# Patient Record
Sex: FEMALE | Race: WHITE | NOT HISPANIC OR LATINO | URBAN - METROPOLITAN AREA
[De-identification: names, ages, dates, MRNs, and addresses within clinical notes are randomized per-mention and may not be internally consistent; named-entity substitution may affect disease eponyms.]

---

## 2022-09-26 ENCOUNTER — INPATIENT (INPATIENT)
Facility: HOSPITAL | Age: 87
LOS: 3 days | Discharge: HOME HEALTH | DRG: 480 | End: 2022-09-30
Attending: HOSPITALIST | Admitting: INTERNAL MEDICINE
Payer: MEDICARE

## 2022-09-26 VITALS
HEIGHT: 63 IN | WEIGHT: 179.9 LBS | RESPIRATION RATE: 20 BRPM | HEART RATE: 88 BPM | SYSTOLIC BLOOD PRESSURE: 153 MMHG | DIASTOLIC BLOOD PRESSURE: 81 MMHG | TEMPERATURE: 98 F

## 2022-09-26 LAB
BASOPHILS # BLD AUTO: 0.1 K/UL — SIGNIFICANT CHANGE UP (ref 0–0.2)
BASOPHILS NFR BLD AUTO: 0.5 % — SIGNIFICANT CHANGE UP (ref 0–2)
EOSINOPHIL # BLD AUTO: 0.07 K/UL — SIGNIFICANT CHANGE UP (ref 0–0.5)
EOSINOPHIL NFR BLD AUTO: 0.3 % — SIGNIFICANT CHANGE UP (ref 0–6)
HCT VFR BLD CALC: 43.4 % — SIGNIFICANT CHANGE UP (ref 34.5–45)
HGB BLD-MCNC: 14.4 G/DL — SIGNIFICANT CHANGE UP (ref 11.5–15.5)
IMM GRANULOCYTES NFR BLD AUTO: 1 % — HIGH (ref 0–0.9)
LYMPHOCYTES # BLD AUTO: 11.4 % — LOW (ref 13–44)
LYMPHOCYTES # BLD AUTO: 2.31 K/UL — SIGNIFICANT CHANGE UP (ref 1–3.3)
MCHC RBC-ENTMCNC: 32.4 PG — SIGNIFICANT CHANGE UP (ref 27–34)
MCHC RBC-ENTMCNC: 33.2 GM/DL — SIGNIFICANT CHANGE UP (ref 32–36)
MCV RBC AUTO: 97.5 FL — SIGNIFICANT CHANGE UP (ref 80–100)
MONOCYTES # BLD AUTO: 0.77 K/UL — SIGNIFICANT CHANGE UP (ref 0–0.9)
MONOCYTES NFR BLD AUTO: 3.8 % — SIGNIFICANT CHANGE UP (ref 2–14)
NEUTROPHILS # BLD AUTO: 16.85 K/UL — HIGH (ref 1.8–7.4)
NEUTROPHILS NFR BLD AUTO: 83 % — HIGH (ref 43–77)
PLATELET # BLD AUTO: 351 K/UL — SIGNIFICANT CHANGE UP (ref 150–400)
RBC # BLD: 4.45 M/UL — SIGNIFICANT CHANGE UP (ref 3.8–5.2)
RBC # FLD: 14.6 % — HIGH (ref 10.3–14.5)
WBC # BLD: 20.31 K/UL — HIGH (ref 3.8–10.5)
WBC # FLD AUTO: 20.31 K/UL — HIGH (ref 3.8–10.5)

## 2022-09-26 PROCEDURE — 99285 EMERGENCY DEPT VISIT HI MDM: CPT

## 2022-09-26 RX ORDER — MORPHINE SULFATE 50 MG/1
2 CAPSULE, EXTENDED RELEASE ORAL
Refills: 0 | Status: DISCONTINUED | OUTPATIENT
Start: 2022-09-26 | End: 2022-09-30

## 2022-09-26 NOTE — ED ADULT TRIAGE NOTE - CHIEF COMPLAINT QUOTE
Patient BIBEMS s/p fall in the kitchen. Patient unsure what happened, all she know is she tripped. Denies head strike, -LOC. Not on blood thinners. Endorses pain on her right hip thigh area. In triage patient is 90% RA, placed on 2L. Denies chest pain.

## 2022-09-26 NOTE — ED PROVIDER NOTE - PROGRESS NOTE DETAILS
case d/w ortho and request cross table lateral and femur and will evaluate pt case d/w Pravin Kessler and will admit with bridge and MADISON

## 2022-09-26 NOTE — ED PROVIDER NOTE - OBJECTIVE STATEMENT
87 y/o female in ED c/o right hip pain s/p trip and fall onto right hip x 1 hr.   pt's son states pt was standing in front of  with her rubber bottom sneakers and appeared that pt attempted to turn when her foot got caught leading to fall onto right side.   denies any LOC, HA, head trauma, neck pain, cp, sob, n/v/d/abd pain.   states pt stuck her hands out.

## 2022-09-26 NOTE — ED ADULT TRIAGE NOTE - BSA (M2)
1.85
Return to doctor sooner if fever > 101 x 2 days, difficulty breathing or swallowing, vomiting green or with blood, diarrhea with blood , refuses to drink fluids, less than 3 urinations per day or symptoms worse.    [place] BRAT , bananas, RICE, apple sauce , toast cracker, Ronald soup    Limit dairy or greasy foods, no juice or fruits or vegetables till diarrhea better    Tylenol as needed for pain    Diarrhea, Child  Diarrhea is frequent loose and watery bowel movements. Diarrhea can make your child feel weak and cause him or her to become dehydrated. Dehydration can make your child tired and thirsty. Your child may also urinate less often and have a dry mouth. Diarrhea typically lasts 2–3 days. However, it can last longer if it is a sign of something more serious. It is important to treat diarrhea as told by your child’s health care provider.    Follow these instructions at home:  Eating and drinking     Follow these recommendations as told by your child’s health care provider:    Give your child an oral rehydration solution (ORS), if directed. This is a drink that is sold at pharmacies and retail stores.  Encourage your child to drink lots of fluids to prevent dehydration. Avoid giving your child fluids that contain a lot of sugar or caffeine, such as juice and soda.  Continue to breastfeed or bottle-feed your young child. Do not give extra water to your child.  Continue your child’s regular diet, but avoid spicy or fatty foods, such as french fries or pizza.    General instructions     Make sure that you and your child wash your hands often. If soap and water are not available, use hand .  Make sure that all people in your household wash their hands well and often.  Give over-the-counter and prescription medicines only as told by your child's health care provider.  Have your child take a warm bath to relieve any burning or pain from frequent diarrhea episodes.  Watch your child’s condition for any changes.  Have your child drink enough fluids to keep his or her urine clear or pale yellow.  Keep all follow-up visits as told by your child's health care provider. This is important.    Contact a health care provider if:  Your child’s diarrhea lasts longer than 3 days.  Your child has a fever.  Your child will not drink fluids or cannot keep fluids down.  Your child feels light-headed or dizzy.  Your child has a headache.  Your child has muscle cramps.  Get help right away if:  You notice signs of dehydration in your child, such as:    No urine in 8–12 hours.  Cracked lips.  Not making tears while crying.  Dry mouth.  Sunken eyes.  Sleepiness.  Weakness.    Your child starts to vomit.  Your child has bloody or black stools or stools that look like tar.  Your child has pain in the abdomen.  Your child has difficulty breathing or is breathing very quickly.  Your child’s heart is beating very quickly.  Your child's skin feels cold and clammy.  Your child seems confused.  This information is not intended to replace advice given to you by your health care provider. Make sure you discuss any questions you have with your health care provider.

## 2022-09-27 ENCOUNTER — TRANSCRIPTION ENCOUNTER (OUTPATIENT)
Age: 87
End: 2022-09-27

## 2022-09-27 DIAGNOSIS — S72.001A FRACTURE OF UNSPECIFIED PART OF NECK OF RIGHT FEMUR, INITIAL ENCOUNTER FOR CLOSED FRACTURE: ICD-10-CM

## 2022-09-27 LAB
24R-OH-CALCIDIOL SERPL-MCNC: 19.5 NG/ML — LOW (ref 30–80)
ALBUMIN SERPL ELPH-MCNC: 3.4 G/DL — SIGNIFICANT CHANGE UP (ref 3.3–5)
ALBUMIN SERPL ELPH-MCNC: 3.8 G/DL — SIGNIFICANT CHANGE UP (ref 3.3–5)
ALP SERPL-CCNC: 83 U/L — SIGNIFICANT CHANGE UP (ref 40–120)
ALT FLD-CCNC: 22 U/L — SIGNIFICANT CHANGE UP (ref 12–78)
ANION GAP SERPL CALC-SCNC: 6 MMOL/L — SIGNIFICANT CHANGE UP (ref 5–17)
ANION GAP SERPL CALC-SCNC: 7 MMOL/L — SIGNIFICANT CHANGE UP (ref 5–17)
ANION GAP SERPL CALC-SCNC: 9 MMOL/L — SIGNIFICANT CHANGE UP (ref 5–17)
APTT BLD: 26 SEC — LOW (ref 27.5–35.5)
APTT BLD: 27.7 SEC — SIGNIFICANT CHANGE UP (ref 27.5–35.5)
AST SERPL-CCNC: 28 U/L — SIGNIFICANT CHANGE UP (ref 15–37)
BILIRUB SERPL-MCNC: 0.4 MG/DL — SIGNIFICANT CHANGE UP (ref 0.2–1.2)
BUN SERPL-MCNC: 18 MG/DL — SIGNIFICANT CHANGE UP (ref 7–23)
BUN SERPL-MCNC: 20 MG/DL — SIGNIFICANT CHANGE UP (ref 7–23)
BUN SERPL-MCNC: 22 MG/DL — SIGNIFICANT CHANGE UP (ref 7–23)
CALCIUM SERPL-MCNC: 8.9 MG/DL — SIGNIFICANT CHANGE UP (ref 8.5–10.1)
CALCIUM SERPL-MCNC: 9.2 MG/DL — SIGNIFICANT CHANGE UP (ref 8.5–10.1)
CALCIUM SERPL-MCNC: 9.6 MG/DL — SIGNIFICANT CHANGE UP (ref 8.5–10.1)
CHLORIDE SERPL-SCNC: 107 MMOL/L — SIGNIFICANT CHANGE UP (ref 96–108)
CHLORIDE SERPL-SCNC: 108 MMOL/L — SIGNIFICANT CHANGE UP (ref 96–108)
CHLORIDE SERPL-SCNC: 111 MMOL/L — HIGH (ref 96–108)
CO2 SERPL-SCNC: 24 MMOL/L — SIGNIFICANT CHANGE UP (ref 22–31)
CO2 SERPL-SCNC: 24 MMOL/L — SIGNIFICANT CHANGE UP (ref 22–31)
CO2 SERPL-SCNC: 25 MMOL/L — SIGNIFICANT CHANGE UP (ref 22–31)
CREAT SERPL-MCNC: 0.98 MG/DL — SIGNIFICANT CHANGE UP (ref 0.5–1.3)
CREAT SERPL-MCNC: 1.06 MG/DL — SIGNIFICANT CHANGE UP (ref 0.5–1.3)
CREAT SERPL-MCNC: 1.32 MG/DL — HIGH (ref 0.5–1.3)
EGFR: 39 ML/MIN/1.73M2 — LOW
EGFR: 51 ML/MIN/1.73M2 — LOW
EGFR: 56 ML/MIN/1.73M2 — LOW
GLUCOSE SERPL-MCNC: 152 MG/DL — HIGH (ref 70–99)
GLUCOSE SERPL-MCNC: 170 MG/DL — HIGH (ref 70–99)
GLUCOSE SERPL-MCNC: 203 MG/DL — HIGH (ref 70–99)
HCT VFR BLD CALC: 36.1 % — SIGNIFICANT CHANGE UP (ref 34.5–45)
HCT VFR BLD CALC: 40 % — SIGNIFICANT CHANGE UP (ref 34.5–45)
HGB BLD-MCNC: 11.9 G/DL — SIGNIFICANT CHANGE UP (ref 11.5–15.5)
HGB BLD-MCNC: 13.3 G/DL — SIGNIFICANT CHANGE UP (ref 11.5–15.5)
INR BLD: 0.97 RATIO — SIGNIFICANT CHANGE UP (ref 0.88–1.16)
INR BLD: 1.05 RATIO — SIGNIFICANT CHANGE UP (ref 0.88–1.16)
MCHC RBC-ENTMCNC: 32 PG — SIGNIFICANT CHANGE UP (ref 27–34)
MCHC RBC-ENTMCNC: 32.6 PG — SIGNIFICANT CHANGE UP (ref 27–34)
MCHC RBC-ENTMCNC: 33 GM/DL — SIGNIFICANT CHANGE UP (ref 32–36)
MCHC RBC-ENTMCNC: 33.3 GM/DL — SIGNIFICANT CHANGE UP (ref 32–36)
MCV RBC AUTO: 96.2 FL — SIGNIFICANT CHANGE UP (ref 80–100)
MCV RBC AUTO: 98.9 FL — SIGNIFICANT CHANGE UP (ref 80–100)
PLATELET # BLD AUTO: 264 K/UL — SIGNIFICANT CHANGE UP (ref 150–400)
PLATELET # BLD AUTO: 294 K/UL — SIGNIFICANT CHANGE UP (ref 150–400)
POTASSIUM SERPL-MCNC: 3.9 MMOL/L — SIGNIFICANT CHANGE UP (ref 3.5–5.3)
POTASSIUM SERPL-MCNC: 4 MMOL/L — SIGNIFICANT CHANGE UP (ref 3.5–5.3)
POTASSIUM SERPL-MCNC: 4.1 MMOL/L — SIGNIFICANT CHANGE UP (ref 3.5–5.3)
POTASSIUM SERPL-SCNC: 3.9 MMOL/L — SIGNIFICANT CHANGE UP (ref 3.5–5.3)
POTASSIUM SERPL-SCNC: 4 MMOL/L — SIGNIFICANT CHANGE UP (ref 3.5–5.3)
POTASSIUM SERPL-SCNC: 4.1 MMOL/L — SIGNIFICANT CHANGE UP (ref 3.5–5.3)
PROT SERPL-MCNC: 8.2 GM/DL — SIGNIFICANT CHANGE UP (ref 6–8.3)
PROTHROM AB SERPL-ACNC: 11.3 SEC — SIGNIFICANT CHANGE UP (ref 10.5–13.4)
PROTHROM AB SERPL-ACNC: 12.2 SEC — SIGNIFICANT CHANGE UP (ref 10.5–13.4)
RAPID RVP RESULT: SIGNIFICANT CHANGE UP
RBC # BLD: 3.65 M/UL — LOW (ref 3.8–5.2)
RBC # BLD: 4.16 M/UL — SIGNIFICANT CHANGE UP (ref 3.8–5.2)
RBC # FLD: 14.6 % — HIGH (ref 10.3–14.5)
RBC # FLD: 14.9 % — HIGH (ref 10.3–14.5)
SARS-COV-2 RNA SPEC QL NAA+PROBE: SIGNIFICANT CHANGE UP
SODIUM SERPL-SCNC: 139 MMOL/L — SIGNIFICANT CHANGE UP (ref 135–145)
SODIUM SERPL-SCNC: 140 MMOL/L — SIGNIFICANT CHANGE UP (ref 135–145)
SODIUM SERPL-SCNC: 142 MMOL/L — SIGNIFICANT CHANGE UP (ref 135–145)
TROPONIN I, HIGH SENSITIVITY RESULT: 5.7 NG/L — SIGNIFICANT CHANGE UP
WBC # BLD: 18.43 K/UL — HIGH (ref 3.8–10.5)
WBC # BLD: 19.82 K/UL — HIGH (ref 3.8–10.5)
WBC # FLD AUTO: 18.43 K/UL — HIGH (ref 3.8–10.5)
WBC # FLD AUTO: 19.82 K/UL — HIGH (ref 3.8–10.5)

## 2022-09-27 PROCEDURE — 76000 FLUOROSCOPY <1 HR PHYS/QHP: CPT

## 2022-09-27 PROCEDURE — 87635 SARS-COV-2 COVID-19 AMP PRB: CPT

## 2022-09-27 PROCEDURE — 97530 THERAPEUTIC ACTIVITIES: CPT | Mod: GP

## 2022-09-27 PROCEDURE — 93005 ELECTROCARDIOGRAM TRACING: CPT

## 2022-09-27 PROCEDURE — 93010 ELECTROCARDIOGRAM REPORT: CPT

## 2022-09-27 PROCEDURE — 73560 X-RAY EXAM OF KNEE 1 OR 2: CPT | Mod: 26,RT

## 2022-09-27 PROCEDURE — 71045 X-RAY EXAM CHEST 1 VIEW: CPT | Mod: 26

## 2022-09-27 PROCEDURE — C1889: CPT

## 2022-09-27 PROCEDURE — 85610 PROTHROMBIN TIME: CPT

## 2022-09-27 PROCEDURE — 36415 COLL VENOUS BLD VENIPUNCTURE: CPT

## 2022-09-27 PROCEDURE — C1713: CPT

## 2022-09-27 PROCEDURE — 73552 X-RAY EXAM OF FEMUR 2/>: CPT | Mod: 26,RT

## 2022-09-27 PROCEDURE — 82306 VITAMIN D 25 HYDROXY: CPT

## 2022-09-27 PROCEDURE — 73552 X-RAY EXAM OF FEMUR 2/>: CPT | Mod: RT

## 2022-09-27 PROCEDURE — 73502 X-RAY EXAM HIP UNI 2-3 VIEWS: CPT | Mod: 26,RT

## 2022-09-27 PROCEDURE — 80048 BASIC METABOLIC PNL TOTAL CA: CPT

## 2022-09-27 PROCEDURE — 99223 1ST HOSP IP/OBS HIGH 75: CPT

## 2022-09-27 PROCEDURE — 71045 X-RAY EXAM CHEST 1 VIEW: CPT

## 2022-09-27 PROCEDURE — 87040 BLOOD CULTURE FOR BACTERIA: CPT

## 2022-09-27 PROCEDURE — 73560 X-RAY EXAM OF KNEE 1 OR 2: CPT | Mod: RT

## 2022-09-27 PROCEDURE — 82040 ASSAY OF SERUM ALBUMIN: CPT

## 2022-09-27 PROCEDURE — 85730 THROMBOPLASTIN TIME PARTIAL: CPT

## 2022-09-27 PROCEDURE — 97162 PT EVAL MOD COMPLEX 30 MIN: CPT | Mod: GP

## 2022-09-27 PROCEDURE — 93970 EXTREMITY STUDY: CPT

## 2022-09-27 PROCEDURE — 27245 TREAT THIGH FRACTURE: CPT | Mod: RT

## 2022-09-27 PROCEDURE — 85027 COMPLETE CBC AUTOMATED: CPT

## 2022-09-27 PROCEDURE — 71275 CT ANGIOGRAPHY CHEST: CPT

## 2022-09-27 PROCEDURE — 97116 GAIT TRAINING THERAPY: CPT | Mod: GP

## 2022-09-27 PROCEDURE — C9399: CPT

## 2022-09-27 PROCEDURE — 27245 TREAT THIGH FRACTURE: CPT | Mod: AS

## 2022-09-27 RX ORDER — OXYCODONE HYDROCHLORIDE 5 MG/1
2.5 TABLET ORAL EVERY 4 HOURS
Refills: 0 | Status: DISCONTINUED | OUTPATIENT
Start: 2022-09-27 | End: 2022-09-27

## 2022-09-27 RX ORDER — SIMVASTATIN 20 MG/1
1 TABLET, FILM COATED ORAL
Qty: 0 | Refills: 0 | DISCHARGE

## 2022-09-27 RX ORDER — POLYETHYLENE GLYCOL 3350 17 G/17G
17 POWDER, FOR SOLUTION ORAL AT BEDTIME
Refills: 0 | Status: DISCONTINUED | OUTPATIENT
Start: 2022-09-27 | End: 2022-09-27

## 2022-09-27 RX ORDER — CEFAZOLIN SODIUM 1 G
2000 VIAL (EA) INJECTION EVERY 8 HOURS
Refills: 0 | Status: COMPLETED | OUTPATIENT
Start: 2022-09-27 | End: 2022-09-28

## 2022-09-27 RX ORDER — SODIUM CHLORIDE 9 MG/ML
1000 INJECTION, SOLUTION INTRAVENOUS
Refills: 0 | Status: DISCONTINUED | OUTPATIENT
Start: 2022-09-27 | End: 2022-09-30

## 2022-09-27 RX ORDER — OXYCODONE HYDROCHLORIDE 5 MG/1
10 TABLET ORAL EVERY 4 HOURS
Refills: 0 | Status: DISCONTINUED | OUTPATIENT
Start: 2022-09-27 | End: 2022-09-27

## 2022-09-27 RX ORDER — AMLODIPINE BESYLATE 2.5 MG/1
1 TABLET ORAL
Qty: 0 | Refills: 0 | DISCHARGE

## 2022-09-27 RX ORDER — OXYCODONE HYDROCHLORIDE 5 MG/1
5 TABLET ORAL ONCE
Refills: 0 | Status: DISCONTINUED | OUTPATIENT
Start: 2022-09-27 | End: 2022-09-27

## 2022-09-27 RX ORDER — FERROUS SULFATE 325(65) MG
325 TABLET ORAL DAILY
Refills: 0 | Status: DISCONTINUED | OUTPATIENT
Start: 2022-09-27 | End: 2022-09-30

## 2022-09-27 RX ORDER — OXYCODONE HYDROCHLORIDE 5 MG/1
10 TABLET ORAL EVERY 4 HOURS
Refills: 0 | Status: DISCONTINUED | OUTPATIENT
Start: 2022-09-27 | End: 2022-09-30

## 2022-09-27 RX ORDER — HEPARIN SODIUM 5000 [USP'U]/ML
5000 INJECTION INTRAVENOUS; SUBCUTANEOUS EVERY 8 HOURS
Refills: 0 | Status: DISCONTINUED | OUTPATIENT
Start: 2022-09-28 | End: 2022-09-28

## 2022-09-27 RX ORDER — SODIUM CHLORIDE 9 MG/ML
1000 INJECTION, SOLUTION INTRAVENOUS
Refills: 0 | Status: DISCONTINUED | OUTPATIENT
Start: 2022-09-27 | End: 2022-09-27

## 2022-09-27 RX ORDER — SENNA PLUS 8.6 MG/1
2 TABLET ORAL AT BEDTIME
Refills: 0 | Status: DISCONTINUED | OUTPATIENT
Start: 2022-09-27 | End: 2022-09-27

## 2022-09-27 RX ORDER — FENTANYL CITRATE 50 UG/ML
50 INJECTION INTRAVENOUS
Refills: 0 | Status: DISCONTINUED | OUTPATIENT
Start: 2022-09-27 | End: 2022-09-27

## 2022-09-27 RX ORDER — LEVOTHYROXINE SODIUM 125 MCG
75 TABLET ORAL DAILY
Refills: 0 | Status: DISCONTINUED | OUTPATIENT
Start: 2022-09-27 | End: 2022-09-30

## 2022-09-27 RX ORDER — SIMVASTATIN 20 MG/1
40 TABLET, FILM COATED ORAL AT BEDTIME
Refills: 0 | Status: DISCONTINUED | OUTPATIENT
Start: 2022-09-27 | End: 2022-09-30

## 2022-09-27 RX ORDER — AMLODIPINE BESYLATE 2.5 MG/1
10 TABLET ORAL DAILY
Refills: 0 | Status: DISCONTINUED | OUTPATIENT
Start: 2022-09-27 | End: 2022-09-30

## 2022-09-27 RX ORDER — SODIUM CHLORIDE 9 MG/ML
1000 INJECTION INTRAMUSCULAR; INTRAVENOUS; SUBCUTANEOUS
Refills: 0 | Status: DISCONTINUED | OUTPATIENT
Start: 2022-09-27 | End: 2022-09-30

## 2022-09-27 RX ORDER — OXYCODONE HYDROCHLORIDE 5 MG/1
5 TABLET ORAL EVERY 4 HOURS
Refills: 0 | Status: DISCONTINUED | OUTPATIENT
Start: 2022-09-27 | End: 2022-09-27

## 2022-09-27 RX ORDER — OXYCODONE HYDROCHLORIDE 5 MG/1
5 TABLET ORAL EVERY 4 HOURS
Refills: 0 | Status: DISCONTINUED | OUTPATIENT
Start: 2022-09-27 | End: 2022-09-30

## 2022-09-27 RX ORDER — FOLIC ACID 0.8 MG
1 TABLET ORAL DAILY
Refills: 0 | Status: DISCONTINUED | OUTPATIENT
Start: 2022-09-27 | End: 2022-09-30

## 2022-09-27 RX ORDER — ONDANSETRON 8 MG/1
4 TABLET, FILM COATED ORAL ONCE
Refills: 0 | Status: DISCONTINUED | OUTPATIENT
Start: 2022-09-27 | End: 2022-09-27

## 2022-09-27 RX ORDER — CELECOXIB 200 MG/1
200 CAPSULE ORAL EVERY 12 HOURS
Refills: 0 | Status: DISCONTINUED | OUTPATIENT
Start: 2022-09-28 | End: 2022-09-30

## 2022-09-27 RX ORDER — ONDANSETRON 8 MG/1
8 TABLET, FILM COATED ORAL EVERY 8 HOURS
Refills: 0 | Status: DISCONTINUED | OUTPATIENT
Start: 2022-09-27 | End: 2022-09-30

## 2022-09-27 RX ORDER — POLYETHYLENE GLYCOL 3350 17 G/17G
17 POWDER, FOR SOLUTION ORAL DAILY
Refills: 0 | Status: DISCONTINUED | OUTPATIENT
Start: 2022-09-27 | End: 2022-09-30

## 2022-09-27 RX ORDER — PANTOPRAZOLE SODIUM 20 MG/1
40 TABLET, DELAYED RELEASE ORAL
Refills: 0 | Status: DISCONTINUED | OUTPATIENT
Start: 2022-09-27 | End: 2022-09-30

## 2022-09-27 RX ORDER — ACETAMINOPHEN 500 MG
975 TABLET ORAL EVERY 8 HOURS
Refills: 0 | Status: DISCONTINUED | OUTPATIENT
Start: 2022-09-27 | End: 2022-09-30

## 2022-09-27 RX ORDER — LISINOPRIL 2.5 MG/1
20 TABLET ORAL DAILY
Refills: 0 | Status: DISCONTINUED | OUTPATIENT
Start: 2022-09-27 | End: 2022-09-30

## 2022-09-27 RX ORDER — HYDROMORPHONE HYDROCHLORIDE 2 MG/ML
0.5 INJECTION INTRAMUSCULAR; INTRAVENOUS; SUBCUTANEOUS EVERY 4 HOURS
Refills: 0 | Status: DISCONTINUED | OUTPATIENT
Start: 2022-09-27 | End: 2022-09-30

## 2022-09-27 RX ORDER — SENNA PLUS 8.6 MG/1
2 TABLET ORAL AT BEDTIME
Refills: 0 | Status: DISCONTINUED | OUTPATIENT
Start: 2022-09-27 | End: 2022-09-30

## 2022-09-27 RX ORDER — ACETAMINOPHEN 500 MG
975 TABLET ORAL EVERY 8 HOURS
Refills: 0 | Status: DISCONTINUED | OUTPATIENT
Start: 2022-09-27 | End: 2022-09-27

## 2022-09-27 RX ORDER — ASCORBIC ACID 60 MG
500 TABLET,CHEWABLE ORAL
Refills: 0 | Status: DISCONTINUED | OUTPATIENT
Start: 2022-09-27 | End: 2022-09-30

## 2022-09-27 RX ORDER — ACETAMINOPHEN 500 MG
1000 TABLET ORAL ONCE
Refills: 0 | Status: COMPLETED | OUTPATIENT
Start: 2022-09-27 | End: 2022-09-27

## 2022-09-27 RX ORDER — LEVOTHYROXINE SODIUM 125 MCG
1 TABLET ORAL
Qty: 0 | Refills: 0 | DISCHARGE

## 2022-09-27 RX ORDER — TRAMADOL HYDROCHLORIDE 50 MG/1
50 TABLET ORAL EVERY 4 HOURS
Refills: 0 | Status: DISCONTINUED | OUTPATIENT
Start: 2022-09-27 | End: 2022-09-30

## 2022-09-27 RX ORDER — PROCHLORPERAZINE MALEATE 5 MG
5 TABLET ORAL EVERY 8 HOURS
Refills: 0 | Status: DISCONTINUED | OUTPATIENT
Start: 2022-09-27 | End: 2022-09-30

## 2022-09-27 RX ADMIN — SENNA PLUS 2 TABLET(S): 8.6 TABLET ORAL at 22:23

## 2022-09-27 RX ADMIN — Medication 975 MILLIGRAM(S): at 14:56

## 2022-09-27 RX ADMIN — Medication 975 MILLIGRAM(S): at 13:56

## 2022-09-27 RX ADMIN — Medication 400 MILLIGRAM(S): at 20:11

## 2022-09-27 RX ADMIN — FENTANYL CITRATE 50 MICROGRAM(S): 50 INJECTION INTRAVENOUS at 19:30

## 2022-09-27 RX ADMIN — MORPHINE SULFATE 2 MILLIGRAM(S): 50 CAPSULE, EXTENDED RELEASE ORAL at 02:30

## 2022-09-27 RX ADMIN — Medication 500 MILLIGRAM(S): at 22:23

## 2022-09-27 RX ADMIN — Medication 75 MICROGRAM(S): at 10:41

## 2022-09-27 RX ADMIN — LISINOPRIL 20 MILLIGRAM(S): 2.5 TABLET ORAL at 10:41

## 2022-09-27 RX ADMIN — SIMVASTATIN 40 MILLIGRAM(S): 20 TABLET, FILM COATED ORAL at 22:23

## 2022-09-27 RX ADMIN — OXYCODONE HYDROCHLORIDE 10 MILLIGRAM(S): 5 TABLET ORAL at 11:41

## 2022-09-27 RX ADMIN — AMLODIPINE BESYLATE 10 MILLIGRAM(S): 2.5 TABLET ORAL at 10:41

## 2022-09-27 RX ADMIN — MORPHINE SULFATE 2 MILLIGRAM(S): 50 CAPSULE, EXTENDED RELEASE ORAL at 05:04

## 2022-09-27 RX ADMIN — OXYCODONE HYDROCHLORIDE 10 MILLIGRAM(S): 5 TABLET ORAL at 10:41

## 2022-09-27 RX ADMIN — SODIUM CHLORIDE 75 MILLILITER(S): 9 INJECTION, SOLUTION INTRAVENOUS at 06:00

## 2022-09-27 NOTE — CONSULT NOTE ADULT - SUBJECTIVE AND OBJECTIVE BOX
88yFemale c/o R hip pain s/p mechanical fall. Patient denies head hit or LOC. Patient denies numbness or tingling in the RLE. Patient denies any other injuries.    PMH:    PSH:    AH:    Meds: See med rec    T(C): 36.7 (09-26-22 @ 22:48)  HR: 88 (09-26-22 @ 22:48)  BP: 153/81 (09-26-22 @ 22:48)  RR: 20 (09-26-22 @ 22:48)  SpO2: --  Wt(kg): --                        14.4   20.31 )-----------( 351      ( 26 Sep 2022 23:45 )             43.4     09-26    140  |  107  |  22  ----------------------------<  203<H>  4.1   |  24  |  1.32<H>    Ca    9.6      26 Sep 2022 23:45    TPro  8.2  /  Alb  3.8  /  TBili  0.4  /  DBili  x   /  AST  28  /  ALT  22  /  AlkPhos  83  09-26    PT/INR - ( 26 Sep 2022 23:45 )   PT: 11.3 sec;   INR: 0.97 ratio         PTT - ( 26 Sep 2022 23:45 )  PTT:27.7 sec      PE  Gen: NAD, alert and oriented  Resp: Unlabored breathing  RLE: Skin intact, no ecchymosis,        SILT DP/SP/ Kenn/Saph,        +EHL/FHL/TA/Gastroc,        Knee/ankle painless ROM,        hip ROM limited 2/2 pain,       DP+,        soft compartments, no calf ttp,        +log roll.      Secondary:  No TTP over bony landmarks, SILT BL, ROM intact BL, distal pulses palpable.    Imaging:  XR demonstrating R hip IT fracture    Assessment: 88F with R IT fx    Plan:  -Plan for surgical intervention for IM Nail , will book and begin preop.  -Preop labs/imaging: CBC/BMP/PT/PTT/INR/T&S/Covid/CXR/EKG.  -NPO after midnight/IVFs while NPO.  -NWB  -DVT ppx - HOLD CHEMICAL DVT prophylaxis for OR today  -Pain control prn  -Medical management, continue home meds. Please document medical clearance for surgery.  -Case discussed with attending, will advise if plan changes.  88yFemale c/o R hip pain s/p mechanical fall at home in the kitchen. Patient uses a walker at baseline. Patient denies head hit or LOC. Patient denies numbness or tingling in the RLE. Patient denies any other injuries.    PMH:    PSH:    AH:    Meds: See med rec    T(C): 36.7 (09-26-22 @ 22:48)  HR: 88 (09-26-22 @ 22:48)  BP: 153/81 (09-26-22 @ 22:48)  RR: 20 (09-26-22 @ 22:48)  SpO2: --  Wt(kg): --                        14.4   20.31 )-----------( 351      ( 26 Sep 2022 23:45 )             43.4     09-26    140  |  107  |  22  ----------------------------<  203<H>  4.1   |  24  |  1.32<H>    Ca    9.6      26 Sep 2022 23:45    TPro  8.2  /  Alb  3.8  /  TBili  0.4  /  DBili  x   /  AST  28  /  ALT  22  /  AlkPhos  83  09-26    PT/INR - ( 26 Sep 2022 23:45 )   PT: 11.3 sec;   INR: 0.97 ratio         PTT - ( 26 Sep 2022 23:45 )  PTT:27.7 sec      PE  Gen: NAD, alert and oriented  Resp: Unlabored breathing  RLE: Skin intact, no ecchymosis,        SILT DP/SP/ Kenn/Saph,        +EHL/FHL/TA/Gastroc,        Knee/ankle painless ROM,        hip ROM limited 2/2 pain,       DP+,        soft compartments, no calf ttp,        +log roll.      Secondary:  No TTP over bony landmarks, SILT BL, ROM intact BL, distal pulses palpable.    Imaging:  XR demonstrating R hip IT fracture    Assessment: 88F with R IT fx    Plan:  -Plan for surgical intervention for IM Nail TODAY, will book and begin preop.  -Preop labs/imaging: CBC/BMP/PT/PTT/INR/T&S/Covid/CXR/EKG.  -NPO/IVFs while NPO.  -NWB  -DVT ppx - HOLD CHEMICAL DVT prophylaxis for OR today  -Pain control prn  -Medical management, continue home meds. Please document medical clearance for surgery.  -Case discussed with attending, will advise if plan changes.

## 2022-09-27 NOTE — DISCHARGE NOTE PROVIDER - CARE PROVIDER_API CALL
Nic Chicas; [place] [address]  Phone: [phone]  Fax: [phone]  Follow Up Time:

## 2022-09-27 NOTE — ED ADULT NURSE REASSESSMENT NOTE - NS ED NURSE REASSESS COMMENT FT1
Assumed care of pt. pt is awake and alert, following commands appropriately with equal unlabored respirations. Pt pending bed assignment upstairs. Pt with 2+ right DP pulse, cap refill intact, pt able to wiggle toes and denies any numbness/tingling. Pt on 2L NC. Pt changed and repositioned at this time. Pt with no other complaints at this time.

## 2022-09-27 NOTE — DISCHARGE NOTE PROVIDER - HOSPITAL COURSE
Orthopedic Summary  H&P:  Pt is a 88y Female PAST MEDICAL & SURGICAL HISTORY:        Now s/p RIGHT Hip IM Nail for fracture. Pt is afebrile with stable vital signs. Pain is controlled. Exam reveals intact EHL FHL TA GS, +DP. Dressing is clean and dry.    Hospital Course:  Patient presented to  ED after a fall, found to have a hip fracture, and admitted to the Medical Service. Pt was  medically/cardiac cleared prior to surgery. Prophylactic antibiotics were started before the procedure and continued for 24 hours. They were admitted after surgery to the orthopedic floor.  There were no orthopedic complications during the hospital stay. All home medications were continued.    Routine consults were obtained from the Anticoagulation Team for DVT/PE prophylaxis, from Physical Therapy, and followed by Medicine for Co-management. Patient was placed on  anticoagulation.  Pertinent home medications were continued.  Daily labs were followed.      On POD 0 there were no major issues. Pt received PT daily and will be Discharged per Medicine.  The orthopedic Attending is aware and agrees. See addendum to DC summary per medical team below for any additional info or  changes. Orthopedic Summary  H&P:  Pt is a 88y Female PAST MEDICAL & SURGICAL HISTORY:        Now s/p RIGHT Hip IM Nail for fracture. Pt is afebrile with stable vital signs. Pain is controlled. Exam reveals intact EHL FHL TA GS, +DP. Dressing is clean and dry.    Hospital Course:  Patient presented to [facility] ED after a fall, found to have a hip fracture, and admitted to the Medical Service. Pt was  medically/cardiac cleared prior to surgery. Prophylactic antibiotics were started before the procedure and continued for 24 hours. They were admitted after surgery to the orthopedic floor.  There were no orthopedic complications during the hospital stay. All home medications were continued.    Routine consults were obtained from the Anticoagulation Team for DVT/PE prophylaxis, from Physical Therapy, and followed by Medicine for Co-management. Patient was placed on  anticoagulation.  Pertinent home medications were continued.  Daily labs were followed.      Pt course was complicated by acute hypoxemic resp failure due to PE which is likely a result of surgery. RLE DVT also found (unsure if present at time of arrival) ABLA post operatively was noted.   Pt H/H remained stable and was started on therapeutic dosing of eliquis. she remains on 4L ATC. Atelectatic changes on CXR were noted and pt encouraged to utilize incentive siprometer. No pna found on recent cxr  DC time: 51 min    ICU Vital Signs Last 24 Hrs  T(C): 37 (30 Sep 2022 08:59), Max: 37.2 (29 Sep 2022 20:48)  T(F): 98.6 (30 Sep 2022 08:59), Max: 99 (29 Sep 2022 20:48)  HR: 100 (30 Sep 2022 08:59) (68 - 110)  BP: 114/56 (30 Sep 2022 08:59) (114/56 - 120/73)  BP(mean): --  ABP: --  ABP(mean): --  RR: 16 (30 Sep 2022 08:59) (16 - 18)  SpO2: 92% (30 Sep 2022 08:59) (92% - 93%)    O2 Parameters below as of 30 Sep 2022 08:59  Patient On (Oxygen Delivery Method): nasal cannula  O2 Flow (L/min): 4          PHYSICAL EXAM:    GENERAL: Comfortable, obese, pleasant, no acute distress  HEAD:  Atraumatic, Normocephalic  EYES: EOMI, PERRLA  HEENT: Moist mucous membranes  NECK: Supple, No JVD  NERVOUS SYSTEM:  Alert & Oriented X3, Motor Strength 5/5 B/L upper and lower extremities  CHEST/LUNG: decreased bs at bases bilaterlaly.   HEART: Regular rate and rhythm  ABDOMEN: Soft, Nontender, Nondistended; Bowel sounds present  GENITOURINARY- Voiding, no palpable bladder  EXTREMITIES:  No clubbing, cyanosis, or edema  MUSCULOSKELETAL- Right hip dressing c/d/i.  SKIN-no rash      LABS:                        10.2   13.07 )-----------( 222      ( 28 Sep 2022 08:39 )             31.3       ASSESSMENT AND PLAN:  83f, PMH as above a/w    1. Mechanical fall/right hip fx:  -s/p IM nail POD#3  -pain control  -physical therapy  -incentive spirometry  -dc celebrex as pt to start eliquis.    2. Acute hypoxic respiratory failure:  -d/t PE+ atelectasis.   -would anticoagulate  -d/w AC services, start eliquis.       3. Acute blood loss anemia:  -d/t surgery  -no need for transfusion at this time.   - repeat cbc in1 week to eval status of anemia especially while on A/C    4. HTN:  -continue norvasc/lisinopril   -bp stable.     5. HLD:  -statin.     6. Hypothyroid:  -continue synthroid.

## 2022-09-27 NOTE — H&P ADULT - NS ATTEND AMEND GEN_ALL_CORE FT
Pt seen and examined this am. D/w NP Katiuska Guadalupe. Agree with documentation as above with changes made where appropriate.     88F, PMH of HTN, HLD, Hypothyroidism, who was visiting her son, had a mechanical fall and sustained a right hip fracture.   Scheduled for OR today.   No reactions to anesthesia in the past. no h/o Stroke/mi/significant valvular disease. Had stress test years ago which was negative. NO difficulties walking a few blocks.   No sob/chest pain. denies dysuria/frequency/significant cough/f/c/r.   ekg reviewed.   NO MEDICAL CONTRAINDICATIONS FOR OR AT THIS TIME.   pain control  physical therapy post op  continue norvasc/ace-i with hold parameters.   statin for HLD  synthroid for hypothyroid   dvt px post op.

## 2022-09-27 NOTE — DISCHARGE NOTE PROVIDER - NSDCCPTREATMENT_GEN_ALL_CORE_FT
PRINCIPAL PROCEDURE  Procedure: Intramedullary nailing of femur  Findings and Treatment: RIGHT

## 2022-09-27 NOTE — DISCHARGE NOTE PROVIDER - NSDCFUADDINST_GEN_ALL_CORE_FT
IM Nail DC Instructions:    1. ACTIVITY: Weight Bearing as Tolerated with assistance and rolling walker  2. DVT:Continue DVT/PE Prophylaxis. See Med Rec for Duration and dose.  3. PT: daily  4. FOLLOW UP: Orthopedic Surgeon Nic Chicas in 21 days. Call Office For Appointment.  5. WOUND CARE: MAGO: This type of bandage has a mesh that will fall off by itself and dissolvable sutures, so NO staples to remove  6. BANDAGE: Change bandage on POD7 to dry gauze and tegaderm or paper tape. Can also change PRN if saturated. Do NOT remove on arrival to inspect wound.   IM Nail DC Instructions:    1. ACTIVITY: Weight Bearing as Tolerated with assistance and rolling walker  2. DVT:Continue DVT/PE Prophylaxis. See Med Rec for Duration and dose.  3. PT: daily  4. FOLLOW UP: Orthopedic Surgeon Nic Chicas in 21 days. Call Office For Appointment.  5. WOUND/BANDAGE: Change OUTER bandage on POD7 to dry gauze and tegaderm or paper tape. Do NOT remove on arrival to inspect wound. INNER Glued mesh over the skin incision (PRINEO) should remain and will fall off by itself eventually. There and dissolvable sutures under it. NO STAPLES to remove.    IM Nail DC Instructions:    1. ACTIVITY: Weight Bearing as Tolerated with assistance and rolling walker  2. DVT:Continue DVT/PE Prophylaxis. See Med Rec for Duration and dose.  3. PT: daily  4. FOLLOW UP: Orthopedic Surgeon Nic Chicas in 21 days. Call Office For Appointment.  5. WOUND/BANDAGE: Change OUTER bandage on POD7 to dry gauze and tegaderm or paper tape. Do NOT remove on arrival to inspect wound. INNER Glued mesh over the skin incision (PRINEO) should remain and will fall off by itself eventually. There are dissolvable sutures under it. NO STAPLES to remove.

## 2022-09-27 NOTE — PROGRESS NOTE ADULT - SUBJECTIVE AND OBJECTIVE BOX
pt seen at bedside in PACU resting in NAD, mild pain to right hip, denies N/T RLE no other complaints                          11.9   18.43 )-----------( 264      ( 27 Sep 2022 19:08 )             36.1       PE right hip   dressing c/d/i   compartments soft non tender  FROm ankle and toes  + EHL FHL GS TA   SILT   DP intact

## 2022-09-27 NOTE — H&P ADULT - NS ATTEND OPT1 GEN_ALL_CORE
I attest my time as attending is greater than 50% of the total combined time spent on qualifying patient care activities by the PA/NP and attending.
12

## 2022-09-27 NOTE — DISCHARGE NOTE PROVIDER - NSDCMRMEDTOKEN_GEN_ALL_CORE_FT
amLODIPine 10 mg oral tablet: 1 tab(s) orally once a day  benazepril 20 mg oral tablet: 1 tab(s) orally once a day  levothyroxine 75 mcg (0.075 mg) oral tablet: 1 tab(s) orally once a day  metoprolol succinate 50 mg oral tablet, extended release: 1 tab(s) orally once a day  simvastatin 40 mg oral tablet: 1 tab(s) orally once a day (at bedtime)   amLODIPine 10 mg oral tablet: 1 tab(s) orally once a day  benazepril 20 mg oral tablet: 1 tab(s) orally once a day  enoxaparin: 40 milligram(s) subcutaneous once a day  last dose 10/26/2022  levothyroxine 75 mcg (0.075 mg) oral tablet: 1 tab(s) orally once a day  metoprolol succinate 50 mg oral tablet, extended release: 1 tab(s) orally once a day  simvastatin 40 mg oral tablet: 1 tab(s) orally once a day (at bedtime)  simvastatin 40 mg oral tablet: 1 tab(s) orally once a day (at bedtime)   amLODIPine 10 mg oral tablet: 1 tab(s) orally once a day  bisacodyl 10 mg rectal suppository: 1 suppository(ies) rectal once, As needed, Constipation  Eliquis 5 mg oral tablet: 2 tab(s) orally 2 times a day MDD:20mg pt to take 10mg twice a day until last dose on a.m. dose 10-6   then on pm dose on 10-6 start on 5mg twice a day  ferrous sulfate 325 mg (65 mg elemental iron) oral tablet: 1 tab(s) orally once a day  levothyroxine 75 mcg (0.075 mg) oral tablet: 1 tab(s) orally once a day  lisinopril 20 mg oral tablet: 1 tab(s) orally once a day  oxyCODONE 10 mg oral tablet: 1 tab(s) orally every 4 hours, As needed, Moderate Pain (4 - 6)  oxyCODONE 5 mg oral tablet: 1 tab(s) orally every 4 hours, As needed, Mild Pain (1 - 3)  pantoprazole 40 mg oral delayed release tablet: 1 tab(s) orally once a day (before a meal)  polyethylene glycol 3350 oral powder for reconstitution: 17 gram(s) orally once a day  simvastatin 40 mg oral tablet: 1 tab(s) orally once a day (at bedtime)  simvastatin 40 mg oral tablet: 1 tab(s) orally once a day (at bedtime)  traMADol 50 mg oral tablet: 1 tab(s) orally every 4 hours, As needed, Mild Pain (1 - 3)

## 2022-09-27 NOTE — H&P ADULT - ASSESSMENT
IMP: 87 yo female with above pmh a/w:    # mechanical fall  # Right IT FX  admit to medicine  Ortho consulted appreciated  NPO for planned surgery today  bedrest  Pain control  IVF while NPO  PT eval post op  monitor CBC/BMP  check UA  EKG:  Sinus tachycardia with out any s/s of ischemia  Pt is medically optimized for the planned procedure with a 0 points/class1 risk with a 3.9% 30-day risk of death, MI or cardiac arrest    # HTN  continue amlodipine/lisinopril    #HLD  cont statin    # hypothyroidism  cont levothyroxine    #VTE prophylaxis   hold chemical prophylaxis for now  AC consult post op   Venodynes    Advanced directives:  IMP: 87 yo female with above pmh a/w:    # mechanical fall  # Right IT FX  admit to medicine  Ortho consulted appreciated  NPO for planned surgery today  bedrest  Pain control  IVF while NPO  PT eval post op  monitor CBC/BMP  check UA  EKG:  Sinus tachycardia with out any s/s of ischemia  Pt is medically optimized for the planned procedure with a 0 points/class1 risk with a 3.9% 30-day risk of death, MI or cardiac arrest    # HTN  continue amlodipine/lisinopril    #HLD  cont statin    # hypothyroidism  cont levothyroxine    #VTE prophylaxis   hold chemical prophylaxis for now  AC consult post op   Venodynes    Advanced directives: full code

## 2022-09-27 NOTE — H&P ADULT - NSHPLABSRESULTS_GEN_ALL_CORE
13.3   19.82 )-----------( 294      ( 27 Sep 2022 04:44 )             40.0       09-27    139  |  108  |  20  ----------------------------<  170<H>  4.0   |  24  |  1.06    Ca    9.2      27 Sep 2022 04:44    TPro  8.2  /  Alb  3.8  /  TBili  0.4  /  DBili  x   /  AST  28  /  ALT  22  /  AlkPhos  83  09-26      PT/INR - ( 27 Sep 2022 04:44 )   PT: 12.2 sec;   INR: 1.05 ratio         PTT - ( 27 Sep 2022 04:44 )  PTT:26.0 sec

## 2022-09-27 NOTE — ED ADULT NURSE NOTE - NSFALLRSKHARMRISK_ED_ALL_ED
[id number] [address] EMERGENCY DEPT 
[address] [facility] [id number] [phone] Work/School Note Date: 5/3/2021 To Whom It May concern: 
 
Edie Vergara was seen and treated today in the emergency room by the following provider(s): 
Attending Provider: Guicho Mack MD 
Physician Assistant: KEENAN Johnston.   
 
Edie Vergara may return to work on 5-4-21 with wound covered. Sincerely, KEENAN Scott 
 
 
 
 yes

## 2022-09-27 NOTE — H&P ADULT - HISTORY OF PRESENT ILLNESS
PCP: DR. MINERVA Dooley      HPI:  this is an 89 yo female with pmh: HTN, HLD, hypothyroidism, who was visiting her son for the Jewish holiday and while making coffee turned and caught her foot on part of the floor causing her to trip and fall, witnessed by son.  Pt denies any head strike or any dizziness or lightheadedness prior to falling, denies any recent f/c/r, denies and burning or frequency of urination.  She is not on any anticoagulation meds.  Denies any issues with anesthesia, denies any cardiac issues, states she follows regularly with her PCP. She exercises regularly, was in a exercise class prior to COVID, no longer attends but walk around her block regularly, denies any CP or SOB while doing so,  Imaging + for right IT fx, plan for OR today.     PMH: as above  PSH: "Ulcer surgery," colonscopy, upper endo  FH: mother dec: age 91: MI, Father dec: age 91: MI  Social history: former smoker, quit years ago, rare alcohol, no rec drugs  Allergies NKDA

## 2022-09-27 NOTE — H&P ADULT - NSHPPHYSICALEXAM_GEN_ALL_CORE
PHYSICAL EXAM:    GENERAL: Comfortable, no acute distress   HEAD:  Normocephalic, atraumatic  EYES: EOMI, PERRLA  HEENT: Moist mucous membranes  NECK: Supple, No JVD  NERVOUS SYSTEM:  Alert & Oriented X3, Motor Strength 5/5 B/L upper and lower extremities  CHEST/LUNG: Clear to auscultation bilaterally  HEART: Regular rate and rhythm  ABDOMEN: Soft, obese, Bowel sounds present, noted old midline scar, + ventral hernia  GENITOURINARY: Voiding, no palpable bladder  EXTREMITIES:   No clubbing, cyanosis, or edema  MUSCULOSKELETAL- right leg externally rotated  SKIN-no rash

## 2022-09-27 NOTE — PROGRESS NOTE ADULT - SUBJECTIVE AND OBJECTIVE BOX
Agree w resident H&P. 88F w/ R hip pain s/p GLF. Pt reports no other pain or injuries. Ulises Rebolledo [phone]  RLE - nonttp knee/ankle/foot. +DF/PF/EHL/FHL, sensory intact  XR R hip - displaced IT fx

## 2022-09-27 NOTE — PHARMACOTHERAPY INTERVENTION NOTE - COMMENTS
Medication history complete, reviewed medication with patient, called son Giuseppe and called Dr Sid Dooley at [phone] to confirm what medication patient is taking and confirmed with Maddie.

## 2022-09-27 NOTE — DISCHARGE NOTE PROVIDER - NSDCCPCAREPLAN_GEN_ALL_CORE_FT
PRINCIPAL DISCHARGE DIAGNOSIS  Diagnosis: Hip fracture, right  Assessment and Plan of Treatment:

## 2022-09-28 LAB
ANION GAP SERPL CALC-SCNC: 8 MMOL/L — SIGNIFICANT CHANGE UP (ref 5–17)
BUN SERPL-MCNC: 18 MG/DL — SIGNIFICANT CHANGE UP (ref 7–23)
CALCIUM SERPL-MCNC: 8.6 MG/DL — SIGNIFICANT CHANGE UP (ref 8.5–10.1)
CHLORIDE SERPL-SCNC: 109 MMOL/L — HIGH (ref 96–108)
CO2 SERPL-SCNC: 23 MMOL/L — SIGNIFICANT CHANGE UP (ref 22–31)
CREAT SERPL-MCNC: 0.95 MG/DL — SIGNIFICANT CHANGE UP (ref 0.5–1.3)
EGFR: 58 ML/MIN/1.73M2 — LOW
GLUCOSE SERPL-MCNC: 151 MG/DL — HIGH (ref 70–99)
HCT VFR BLD CALC: 31.3 % — LOW (ref 34.5–45)
HGB BLD-MCNC: 10.2 G/DL — LOW (ref 11.5–15.5)
MCHC RBC-ENTMCNC: 32.1 PG — SIGNIFICANT CHANGE UP (ref 27–34)
MCHC RBC-ENTMCNC: 32.6 GM/DL — SIGNIFICANT CHANGE UP (ref 32–36)
MCV RBC AUTO: 98.4 FL — SIGNIFICANT CHANGE UP (ref 80–100)
PLATELET # BLD AUTO: 222 K/UL — SIGNIFICANT CHANGE UP (ref 150–400)
POTASSIUM SERPL-MCNC: 4 MMOL/L — SIGNIFICANT CHANGE UP (ref 3.5–5.3)
POTASSIUM SERPL-SCNC: 4 MMOL/L — SIGNIFICANT CHANGE UP (ref 3.5–5.3)
RBC # BLD: 3.18 M/UL — LOW (ref 3.8–5.2)
RBC # FLD: 15.2 % — HIGH (ref 10.3–14.5)
SODIUM SERPL-SCNC: 140 MMOL/L — SIGNIFICANT CHANGE UP (ref 135–145)
WBC # BLD: 13.07 K/UL — HIGH (ref 3.8–10.5)
WBC # FLD AUTO: 13.07 K/UL — HIGH (ref 3.8–10.5)

## 2022-09-28 PROCEDURE — 99232 SBSQ HOSP IP/OBS MODERATE 35: CPT

## 2022-09-28 PROCEDURE — 99223 1ST HOSP IP/OBS HIGH 75: CPT

## 2022-09-28 RX ORDER — ENOXAPARIN SODIUM 100 MG/ML
40 INJECTION SUBCUTANEOUS
Qty: 0 | Refills: 0 | DISCHARGE
Start: 2022-09-28

## 2022-09-28 RX ORDER — ENOXAPARIN SODIUM 100 MG/ML
40 INJECTION SUBCUTANEOUS EVERY 24 HOURS
Refills: 0 | Status: DISCONTINUED | OUTPATIENT
Start: 2022-09-28 | End: 2022-09-29

## 2022-09-28 RX ORDER — CHOLECALCIFEROL (VITAMIN D3) 125 MCG
2000 CAPSULE ORAL DAILY
Refills: 0 | Status: DISCONTINUED | OUTPATIENT
Start: 2022-09-28 | End: 2022-09-30

## 2022-09-28 RX ADMIN — SODIUM CHLORIDE 75 MILLILITER(S): 9 INJECTION, SOLUTION INTRAVENOUS at 08:04

## 2022-09-28 RX ADMIN — Medication 975 MILLIGRAM(S): at 20:46

## 2022-09-28 RX ADMIN — Medication 325 MILLIGRAM(S): at 09:24

## 2022-09-28 RX ADMIN — Medication 500 MILLIGRAM(S): at 09:24

## 2022-09-28 RX ADMIN — TRAMADOL HYDROCHLORIDE 50 MILLIGRAM(S): 50 TABLET ORAL at 18:15

## 2022-09-28 RX ADMIN — CELECOXIB 200 MILLIGRAM(S): 200 CAPSULE ORAL at 20:30

## 2022-09-28 RX ADMIN — CELECOXIB 200 MILLIGRAM(S): 200 CAPSULE ORAL at 09:24

## 2022-09-28 RX ADMIN — Medication 975 MILLIGRAM(S): at 05:42

## 2022-09-28 RX ADMIN — ENOXAPARIN SODIUM 40 MILLIGRAM(S): 100 INJECTION SUBCUTANEOUS at 14:38

## 2022-09-28 RX ADMIN — CELECOXIB 200 MILLIGRAM(S): 200 CAPSULE ORAL at 11:15

## 2022-09-28 RX ADMIN — CELECOXIB 200 MILLIGRAM(S): 200 CAPSULE ORAL at 20:46

## 2022-09-28 RX ADMIN — Medication 1 TABLET(S): at 09:25

## 2022-09-28 RX ADMIN — AMLODIPINE BESYLATE 10 MILLIGRAM(S): 2.5 TABLET ORAL at 09:24

## 2022-09-28 RX ADMIN — POLYETHYLENE GLYCOL 3350 17 GRAM(S): 17 POWDER, FOR SOLUTION ORAL at 09:25

## 2022-09-28 RX ADMIN — HEPARIN SODIUM 5000 UNIT(S): 5000 INJECTION INTRAVENOUS; SUBCUTANEOUS at 05:41

## 2022-09-28 RX ADMIN — Medication 1 MILLIGRAM(S): at 09:24

## 2022-09-28 RX ADMIN — Medication 2000 UNIT(S): at 09:24

## 2022-09-28 RX ADMIN — OXYCODONE HYDROCHLORIDE 10 MILLIGRAM(S): 5 TABLET ORAL at 06:30

## 2022-09-28 RX ADMIN — Medication 75 MICROGRAM(S): at 05:42

## 2022-09-28 RX ADMIN — Medication 100 MILLIGRAM(S): at 09:23

## 2022-09-28 RX ADMIN — PANTOPRAZOLE SODIUM 40 MILLIGRAM(S): 20 TABLET, DELAYED RELEASE ORAL at 05:42

## 2022-09-28 RX ADMIN — Medication 975 MILLIGRAM(S): at 14:37

## 2022-09-28 RX ADMIN — TRAMADOL HYDROCHLORIDE 50 MILLIGRAM(S): 50 TABLET ORAL at 04:57

## 2022-09-28 RX ADMIN — Medication 100 MILLIGRAM(S): at 01:02

## 2022-09-28 RX ADMIN — Medication 975 MILLIGRAM(S): at 20:30

## 2022-09-28 RX ADMIN — OXYCODONE HYDROCHLORIDE 10 MILLIGRAM(S): 5 TABLET ORAL at 05:42

## 2022-09-28 RX ADMIN — LISINOPRIL 20 MILLIGRAM(S): 2.5 TABLET ORAL at 09:30

## 2022-09-28 RX ADMIN — TRAMADOL HYDROCHLORIDE 50 MILLIGRAM(S): 50 TABLET ORAL at 03:59

## 2022-09-28 RX ADMIN — Medication 975 MILLIGRAM(S): at 06:24

## 2022-09-28 NOTE — PATIENT PROFILE ADULT - BRAND OF COVID-19 VACCINATION
last dose received approximately 2 months ago, does not remember exact date/Pfizer dose 1, 2, and 3 Pt states pfizer injections x 4, last dose received approximately 2 months ago, does not remember exact date/Pfizer dose 1, 2, and 3

## 2022-09-28 NOTE — PROGRESS NOTE ADULT - SUBJECTIVE AND OBJECTIVE BOX
Patient seen and examined at bedside. Pain is controlled. Patient denies any numbness, tingling, weakness, or any other orthopaedic complaint.    Exam:  T(C): 36.7 (09-28-22 @ 08:59), Max: 36.9 (09-28-22 @ 00:20)  T(F): 98.1 (09-28-22 @ 08:59), Max: 98.4 (09-28-22 @ 00:20)  HR: 99 (09-28-22 @ 08:59) (90 - 104)  BP: 132/57 (09-28-22 @ 08:59) (111/63 - 142/76)  RR: 17 (09-28-22 @ 08:59) (17 - 20)  SpO2: 92% (09-28-22 @ 08:59) (88% - 95%)    Gen: Resting in bed, no acute distress  RLE  Dressing in place, clean/dry/intact.   Madalyn-incisional tenderness to palpation; otherwise, NTTP throughout the rest of the extremity.   SILT L2-S1.  GSC/TA/EHL/FHL intact. Q/H unable to assess 2' pain.   DP pulse palpable.   No calf tenderness bilaterally.   Compartments soft and compressible.                           10.2   13.07 )-----------( 222      ( 28 Sep 2022 08:39 )             31.3     27 Sep 2022 19:08    142    |  111    |  18     ----------------------------<  152    3.9     |  25     |  0.98     Ca    8.9        27 Sep 2022 19:08    TPro  x      /  Alb  3.4    /  TBili  x      /  DBili  x      /  AST  x      /  ALT  x      /  AlkPhos  x      27 Sep 2022 10:24    PT/INR - ( 27 Sep 2022 04:44 )   PT: 12.2 sec;   INR: 1.05 ratio         PTT - ( 27 Sep 2022 04:44 )  PTT:26.0 sec    Assessment and Plan:  88y Female POD1 R IMN    WBAT/PT/OT  Pain management PRN  Continue prophylactic antibiotics  DVT PPx: heparin, appreciate anticoagulation team recommendations.   Incentive spirometry  Dispo: pending recommendations per PT  Will discuss with Dr. Chicas and advise of any changes to the plan.

## 2022-09-28 NOTE — CONSULT NOTE ADULT - SUBJECTIVE AND OBJECTIVE BOX
HPI:  PCP: DR. MINERVA Dooley      HPI:  this is an 89 yo female with pmh: HTN, HLD, hypothyroidism, who was visiting her son for the  holiday and while making coffee turned and caught her foot on part of the floor causing her to trip and fall, witnessed by son.  Pt denies any head strike or any dizziness or lightheadedness prior to falling, denies any recent f/c/r, denies and burning or frequency of urination.  She is not on any anticoagulation meds.  Denies any issues with anesthesia, denies any cardiac issues, states she follows regularly with her PCP. She exercises regularly, was in a exercise class prior to COVID, no longer attends but walk around her block regularly, denies any CP or SOB while doing so,  Imaging + for right IT fx, plan for OR today.     PMH: as above  PSH: "Ulcer surgery," colonscopy, upper endo  FH: mother dec: age 91: MI, Father dec: age 91: MI  Social history: former smoker, quit years ago, rare alcohol, no rec drugs  Allergies NKDA   (27 Sep 2022 09:28)      Patient is a 88y old  Female who presents with a chief complaint of mechanical fall, right hip pain (28 Sep 2022 09:22)      Consulted by Dr. Aviva Lucero   for VTE prophylaxis, risk stratification, and anticoagulation management.    PAST MEDICAL & SURGICAL HISTORY:    Interval History  2022:patient seen at bedside discussed the necessity of anticoagulation with lovenox, risks and benefits explained , patient denies any h/o vte,stroke or bleeding will continue on lovenox, patient in agreement      CrCl:50.5  BMI:31.9  EBL:50ml    Caprini VTE Risk Score:CAPRINI SCORE  AGE RELATED RISK FACTORS                                                       MOBILITY RELATED FACTORS  [ ] Age 41-60 years                                            (1 Point)                  [ ] Bed rest /restricted mobility                             (1 Point)  [ ] Age: 61-74 years                                           (2 Points)                [ ] Plaster cast                                                   (2 Points)  [x ] Age= 75 years                                              (3 Points)                 [ ] Bed bound for more than 72 hours                   (2 Points)    DISEASE RELATED RISK FACTORS                                               GENDER SPECIFIC FACTORS  [ ] Edema in the lower extremities                       (1 Point)           [ ] Pregnancy                                                            (1 Point)  [ ] Varicose veins                                               (1 Point)                  [ ] Post-partum < 6 weeks                                      (1 Point)             [ x] BMI > 25 Kg/m2                                            (1 Point)                  [ ] Hormonal therapy or oral contraception       (1 Point)                 [ ] Sepsis (in the previous month)                        (1 Point)             [ ] History of pregnancy complications                (1Point)  [ ] Pneumonia or serious lung disease                                             [ ] Unexplained or recurrent  (=/>3), premature                                 (In the previous month)                               (1 Point)                birth with toxemia or growth-restricted infant (1 Point)  [ ] Abnormal pulmonary function test            (1 Point)                                   SURGERY RELATED RISK FACTORS  [ ] Acute myocardial infarction                       (1 Point)                  [ ]  Section                                         (1 Point)  [ ] Congestive heart failure (in the previous month) (1 Point)   [ ] Minor surgery   lasting <45 minutes       (1 Point)   [ ] Inflammatory bowel disease                             (1 Point)          [ ] Arthroscopic surgery                                  (2 Points)  [ ] Central venous access                                    (2 Points)            [ ] General surgery lasting >45 minutes      (2 Points)       [ ] Stroke (in the previous month)                  (5 Points)            [ ] Elective major lower extremity arthroplasty (5 Points)                                   [  ] Malignancy (present or past include skin melanoma                                          but exclude  basal skin cell)    (2 points)                                      TRAUMA RELATED RISK FACTORS                HEMATOLOGY RELATED FACTORS                                  [x ] Fracture of the hip, pelvis, or leg                       (5 Points)  [ ] Prior episodes of VTE                                     (3 Points)          [ ] Acute spinal cord injury (in the previous month)  (5 Points)  [ ] Positive family history for VTE                         (3 Points)       [ ] Paralysis (less than 1 month)                          (5 Points)  [ ] Prothrombin 91916 A                                      (3 Points)         [ ] Multiple Trauma (within 1month)                 (5Points)                                                                                                                                                                [ ] Factor V Leiden                                          (3 Points)                                OTHER RISK FACTORS                          [ ] Lupus anticoagulants                                     (3 Points)                       [ ] BMI > 40                          (1 Point)                                                         [ ] Anticardiolipin antibodies                                (3 Points)                   [ ] Smoking                              (1Point)                                                [ ] High homocysteine in the blood                      (3 Points)                [  ] Diabetes requiring insulin (1point)                         [ ] Other congenital or acquired thrombophilia       (3 Points)          [  ] Chemotherapy                   (1 Point)  [ ] Heparin induced thrombocytopenia                  (3 Points)             [  ] Blood Transfusion                (1 point)                                                                                                             Total Score [   9       ]                                                                                                                                                                                                                                                                                                                                                                                                                                         IMPROVE Bleeding Risk Score:3.5        Falls Risk:   High (x  )  Mod (  )  Low (  )      FAMILY HISTORY:  No pertinent family history in first degree relatives      Denies any personal or familial history of clotting or bleeding disorders.    Allergies    No Known Allergies    Intolerances        REVIEW OF SYSTEMS    (  )Fever	     (  )Constipation	(  )SOB				(  )Headache	(  )Dysuria  (  )Chills	     (  )Melena	(  )Dyspnea present on exertion	                    (  )Dizziness                    (  )Polyuria  (  )Nausea	     (  )Hematochezia	(  )Cough			                    (  )Syncope   	(  )Hematuria  (  )Vomiting    (  )Chest Pain	(  )Wheezing			(  )Weakness  (  )Diarrhea     (  )Palpitations	(  )Anorexia			( x )joint pain    All  other review of systems negative: Yes    Vital Signs Last 24 Hrs  T(C): 36.7 (28 Sep 2022 08:59), Max: 36.9 (28 Sep 2022 00:20)  T(F): 98.1 (28 Sep 2022 08:59), Max: 98.4 (28 Sep 2022 00:20)  HR: 99 (28 Sep 2022 08:59) (90 - 104)  BP: 132/57 (28 Sep 2022 08:59) (111/63 - 142/76)  BP(mean): --  RR: 17 (28 Sep 2022 08:59) (17 - 20)  SpO2: 92% (28 Sep 2022 08:59) (88% - 95%)    PHYSICAL EXAM:    Constitutional: Appears Well    Neurological: A& O x 3    Skin: Warm    Respiratory and Thorax: normal effort; Breath sounds: normal; No rales/wheezing/rhonchi  	  Cardiovascular: S1, S2, regular, NMBR	    Gastrointestinal: BS + x 4Q, nontender	    Genitourinary:  Bladder nondistended, nontender    Musculoskeletal:   General Right:   + muscle/joint tenderness,   normal tone, no joint swelling,   ROM: limited	    General Left:   no muscle/joint tenderness,   normal tone, no joint swelling,   ROM: full    Hip:  Right: Dressing CDI;                 Lower extrems:   Right: no calf tenderness              negative angie's sign               + pedal pulses    Left:   no calf tenderness              negative angie's sign               + pedal pulses                          10.2   13.07 )-----------( 222      ( 28 Sep 2022 08:39 )             31.3       09-28    140  |  109<H>  |  18  ----------------------------<  151<H>  4.0   |  23  |  0.95    Ca    8.6      28 Sep 2022 08:39    TPro  x   /  Alb  3.4  /  TBili  x   /  DBili  x   /  AST  x   /  ALT  x   /  AlkPhos  x   09-27      PT/INR - ( 27 Sep 2022 04:44 )   PT: 12.2 sec;   INR: 1.05 ratio         PTT - ( 27 Sep 2022 04:44 )  PTT:26.0 sec				    MEDICATIONS  (STANDING):  acetaminophen     Tablet .. 975 milliGRAM(s) Oral every 8 hours  amLODIPine   Tablet 10 milliGRAM(s) Oral daily  ascorbic acid 500 milliGRAM(s) Oral two times a day  celecoxib 200 milliGRAM(s) Oral every 12 hours  cholecalciferol 2000 Unit(s) Oral daily  enoxaparin Injectable 40 milliGRAM(s) SubCutaneous every 24 hours  ferrous    sulfate 325 milliGRAM(s) Oral daily  folic acid 1 milliGRAM(s) Oral daily  lactated ringers. 1000 milliLiter(s) (75 mL/Hr) IV Continuous <Continuous>  lactated ringers. 1000 milliLiter(s) (75 mL/Hr) IV Continuous <Continuous>  levothyroxine 75 MICROGram(s) Oral daily  lisinopril 20 milliGRAM(s) Oral daily  multivitamin 1 Tablet(s) Oral daily  pantoprazole    Tablet 40 milliGRAM(s) Oral before breakfast  polyethylene glycol 3350 17 Gram(s) Oral daily  senna 2 Tablet(s) Oral at bedtime  simvastatin 40 milliGRAM(s) Oral at bedtime  sodium chloride 0.9%. 1000 milliLiter(s) (125 mL/Hr) IV Continuous <Continuous>    < from: Xray Femur 2 Views, Right (22 @ 03:04) >  IMPRESSION: Intertrochanteric fracture right hip.    Question possible lytic area in 1 of the femoral condyles. Suggest   specific right knee views.    < end of copied text >      DVT Prophylaxis:  LMWH                   ( x )  Heparin SQ           (  )  Coumadin             (  )  Xarelto                  (  )  Eliquis                   (  )  Venodynes           (x  )  Ambulation          ( x )  UFH                       (  )  Contraindicated  (  )  EC ASPIRIN       (  )

## 2022-09-28 NOTE — CONSULT NOTE ADULT - ASSESSMENT
HPI:  This is an 87 yo female with pmh: HTN, HLD, hypothyroidism, s/p fall found to have  right IT fx had IMN done on 9/27/2022  Consulted by Dr. Aviva Lucero   for VTE prophylaxis, risk stratification, and anticoagulation management. Patient is high risk for vte due to age, immobility, surgery, and elevated BMI    plan  D/C heparin   :start Lovenox 40mg sq daily for four weeks post procedure last dose 10/26/2022  :daily cbc/bmp  :LE Venodynes  : increase mobility as tolerated  :Thanks for consult will f/u  :Dispo:rehab

## 2022-09-28 NOTE — PROGRESS NOTE ADULT - SUBJECTIVE AND OBJECTIVE BOX
C/c: mechanical fall/right hip pain    HPI:88F, pmh of HTN, HLD, hypothyroidism, who was visiting her son for the [employer] holiday and while making coffee turned and caught her foot on part of the floor causing her to trip and fall, witnessed by son.  She was admitted with right hip fracture.   Underwent IM nail 9/27    pt seen and examined this am. c/o intermittent pain right hip. Had one episode of n/v earlier today.   Feeling better now.   No sob/chest pain. No difficulty voiding. No f/c/r. Hadn't been out of bed yet.    Review of system- All 10 systems reviewed and is as per HPI otherwise negative.     VITALS  T(C): 36.7 (09-28-22 @ 08:59), Max: 36.9 (09-28-22 @ 00:20)  HR: 99 (09-28-22 @ 08:59) (90 - 104)  BP: 132/57 (09-28-22 @ 08:59) (111/63 - 142/76)  RR: 17 (09-28-22 @ 08:59) (17 - 20)  SpO2: 92% (09-28-22 @ 08:59) (88% - 95%)    PHYSICAL EXAM:    GENERAL: Comfortable, obese, pleasant, no acute distress  HEAD:  Atraumatic, Normocephalic  EYES: EOMI, PERRLA  HEENT: Moist mucous membranes  NECK: Supple, No JVD  NERVOUS SYSTEM:  Alert & Oriented X3, Motor Strength 5/5 B/L upper and lower extremities  CHEST/LUNG: Clear to auscultation bilaterally  HEART: Regular rate and rhythm  ABDOMEN: Soft, Nontender, Nondistended; Bowel sounds present  GENITOURINARY- Voiding, no palpable bladder  EXTREMITIES:  No clubbing, cyanosis, or edema  MUSCULOSKELETAL- Right hip dressing c/d/i.  SKIN-no rash      LABS:                        10.2   13.07 )-----------( 222      ( 28 Sep 2022 08:39 )             31.3     09-28    140  |  109<H>  |  18  ----------------------------<  151<H>  4.0   |  23  |  0.95    Ca    8.6      28 Sep 2022 08:39    TPro  x   /  Alb  3.4  /  TBili  x   /  DBili  x   /  AST  x   /  ALT  x   /  AlkPhos  x   09-27    PT/INR - ( 27 Sep 2022 04:44 )   PT: 12.2 sec;   INR: 1.05 ratio         PTT - ( 27 Sep 2022 04:44 )  PTT:26.0 sec      MEDS  acetaminophen     Tablet .. 975 milliGRAM(s) Oral every 8 hours  amLODIPine   Tablet 10 milliGRAM(s) Oral daily  ascorbic acid 500 milliGRAM(s) Oral two times a day  celecoxib 200 milliGRAM(s) Oral every 12 hours  cholecalciferol 2000 Unit(s) Oral daily  enoxaparin Injectable 40 milliGRAM(s) SubCutaneous every 24 hours  ferrous    sulfate 325 milliGRAM(s) Oral daily  folic acid 1 milliGRAM(s) Oral daily  HYDROmorphone  Injectable 0.5 milliGRAM(s) SubCutaneous every 4 hours PRN  lactated ringers. 1000 milliLiter(s) IV Continuous <Continuous>  lactated ringers. 1000 milliLiter(s) IV Continuous <Continuous>  levothyroxine 75 MICROGram(s) Oral daily  lisinopril 20 milliGRAM(s) Oral daily  morphine  - Injectable 2 milliGRAM(s) IV Push every 5 minutes PRN  multivitamin 1 Tablet(s) Oral daily  ondansetron Injectable 8 milliGRAM(s) IV Push every 8 hours PRN  oxyCODONE    IR 5 milliGRAM(s) Oral every 4 hours PRN  oxyCODONE    IR 10 milliGRAM(s) Oral every 4 hours PRN  pantoprazole    Tablet 40 milliGRAM(s) Oral before breakfast  polyethylene glycol 3350 17 Gram(s) Oral daily  prochlorperazine   Injectable 5 milliGRAM(s) IV Push every 8 hours PRN  senna 2 Tablet(s) Oral at bedtime  simvastatin 40 milliGRAM(s) Oral at bedtime  sodium chloride 0.9%. 1000 milliLiter(s) IV Continuous <Continuous>  traMADol 50 milliGRAM(s) Oral every 4 hours PRN      ASSESSMENT AND PLAN:  83f, PMH as above a/w    1. Mechanical fall/right hip fx:  -s/p IM nail POD#1  -pain control  -physical therapy  -incentive spirometry  -limit narcotics d/t vomiting.     2. Acute blood loss anemia:  -d/t surgery  -no need for transfusion at this time.     3. HTN:  -continue norvasc/lisinopril   -bp stable.     4. HLD:  -statin.     5. Hypothyroid:  -continue synthroid.     6. DVT px  -lovenox per a/c services.     dispo:  denice in next 24-48 hours if h/h stable.

## 2022-09-28 NOTE — PATIENT PROFILE ADULT - FALL HARM RISK - HARM RISK INTERVENTIONS
Assistance with ambulation/Assistance OOB with selected safe patient handling equipment/Communicate Risk of Fall with Harm to all staff/Discuss with provider need for PT consult/Monitor gait and stability/Provide patient with walking aids - walker, cane, crutches/Reinforce activity limits and safety measures with patient and family/Sit up slowly, dangle for a short time, stand at bedside before walking/Tailored Fall Risk Interventions/Use of alarms - bed, chair and/or voice tab/Visual Cue: Yellow wristband and red socks/Bed in lowest position, wheels locked, appropriate side rails in place/Call bell, personal items and telephone in reach/Instruct patient to call for assistance before getting out of bed or chair/Non-slip footwear when patient is out of bed/[place] to call system/Physically safe environment - no spills, clutter or unnecessary equipment/Purposeful Proactive Rounding/Room/bathroom lighting operational, light cord in reach

## 2022-09-28 NOTE — PROGRESS NOTE ADULT - SUBJECTIVE AND OBJECTIVE BOX
pt seen and examined. doing well, pain controlled. one episode of vomiting this am. denies n/t. has not yet been up w pt    moving toes/foot, sensory intact  drsg cdi  compartments soft    s/p R imn  wbat  up w pt  dvt per ac team  dc pending pt  f/u in office 2 weeks after dc

## 2022-09-28 NOTE — PHYSICAL THERAPY INITIAL EVALUATION ADULT - GAIT TRAINING, PT EVAL
The pt will be able to ambulate more than 200 feet independently using the least restrictive assistive device by time of discharge from acute care PT program

## 2022-09-28 NOTE — PHYSICAL THERAPY INITIAL EVALUATION ADULT - ADDITIONAL COMMENTS
The pt reports that she was still driving and independently negotiating her home Prior to this injury.   The pt reports having no steps to negotiate when getting into the home through the garage. The pt reports having 5 steps to her den with unilateral rails.

## 2022-09-28 NOTE — PATIENT PROFILE ADULT - NSPROGENSOURCEINFO_GEN_A_NUR
Thank you for the referral.  I will keep you informed of his progress.   [address],  Richard Null patient

## 2022-09-28 NOTE — PHYSICAL THERAPY INITIAL EVALUATION ADULT - PERTINENT HX OF CURRENT PROBLEM, REHAB EVAL
89 yo female with pmh: HTN, HLD, hypothyroidism, who was visiting her son for the [employer] holiday and while making coffee turned and caught her foot on part of the floor causing her to trip and fall, witnessed by son. Mechanical Fall - Right Intertrochanteric Hip Fracture

## 2022-09-29 LAB
ANION GAP SERPL CALC-SCNC: 6 MMOL/L — SIGNIFICANT CHANGE UP (ref 5–17)
BUN SERPL-MCNC: 22 MG/DL — SIGNIFICANT CHANGE UP (ref 7–23)
CALCIUM SERPL-MCNC: 8.6 MG/DL — SIGNIFICANT CHANGE UP (ref 8.5–10.1)
CHLORIDE SERPL-SCNC: 109 MMOL/L — HIGH (ref 96–108)
CO2 SERPL-SCNC: 25 MMOL/L — SIGNIFICANT CHANGE UP (ref 22–31)
CREAT SERPL-MCNC: 0.86 MG/DL — SIGNIFICANT CHANGE UP (ref 0.5–1.3)
EGFR: 65 ML/MIN/1.73M2 — SIGNIFICANT CHANGE UP
GLUCOSE SERPL-MCNC: 129 MG/DL — HIGH (ref 70–99)
HCT VFR BLD CALC: 30.4 % — LOW (ref 34.5–45)
HGB BLD-MCNC: 10 G/DL — LOW (ref 11.5–15.5)
MCHC RBC-ENTMCNC: 32.3 PG — SIGNIFICANT CHANGE UP (ref 27–34)
MCHC RBC-ENTMCNC: 32.9 GM/DL — SIGNIFICANT CHANGE UP (ref 32–36)
MCV RBC AUTO: 98.1 FL — SIGNIFICANT CHANGE UP (ref 80–100)
PLATELET # BLD AUTO: 204 K/UL — SIGNIFICANT CHANGE UP (ref 150–400)
POTASSIUM SERPL-MCNC: 3.7 MMOL/L — SIGNIFICANT CHANGE UP (ref 3.5–5.3)
POTASSIUM SERPL-SCNC: 3.7 MMOL/L — SIGNIFICANT CHANGE UP (ref 3.5–5.3)
RBC # BLD: 3.1 M/UL — LOW (ref 3.8–5.2)
RBC # FLD: 15 % — HIGH (ref 10.3–14.5)
SARS-COV-2 RNA SPEC QL NAA+PROBE: SIGNIFICANT CHANGE UP
SODIUM SERPL-SCNC: 140 MMOL/L — SIGNIFICANT CHANGE UP (ref 135–145)
WBC # BLD: 11.78 K/UL — HIGH (ref 3.8–10.5)
WBC # FLD AUTO: 11.78 K/UL — HIGH (ref 3.8–10.5)

## 2022-09-29 PROCEDURE — 71275 CT ANGIOGRAPHY CHEST: CPT | Mod: 26

## 2022-09-29 PROCEDURE — 71045 X-RAY EXAM CHEST 1 VIEW: CPT | Mod: 26

## 2022-09-29 PROCEDURE — 99231 SBSQ HOSP IP/OBS SF/LOW 25: CPT

## 2022-09-29 PROCEDURE — 99233 SBSQ HOSP IP/OBS HIGH 50: CPT

## 2022-09-29 PROCEDURE — 93970 EXTREMITY STUDY: CPT | Mod: 26

## 2022-09-29 RX ORDER — APIXABAN 2.5 MG/1
10 TABLET, FILM COATED ORAL
Refills: 0 | Status: DISCONTINUED | OUTPATIENT
Start: 2022-09-29 | End: 2022-09-30

## 2022-09-29 RX ADMIN — Medication 75 MICROGRAM(S): at 06:08

## 2022-09-29 RX ADMIN — CELECOXIB 200 MILLIGRAM(S): 200 CAPSULE ORAL at 10:02

## 2022-09-29 RX ADMIN — APIXABAN 10 MILLIGRAM(S): 2.5 TABLET, FILM COATED ORAL at 15:15

## 2022-09-29 RX ADMIN — Medication 2000 UNIT(S): at 10:01

## 2022-09-29 RX ADMIN — CELECOXIB 200 MILLIGRAM(S): 200 CAPSULE ORAL at 21:33

## 2022-09-29 RX ADMIN — Medication 325 MILLIGRAM(S): at 10:01

## 2022-09-29 RX ADMIN — Medication 975 MILLIGRAM(S): at 06:07

## 2022-09-29 RX ADMIN — Medication 500 MILLIGRAM(S): at 21:35

## 2022-09-29 RX ADMIN — APIXABAN 10 MILLIGRAM(S): 2.5 TABLET, FILM COATED ORAL at 21:34

## 2022-09-29 RX ADMIN — AMLODIPINE BESYLATE 10 MILLIGRAM(S): 2.5 TABLET ORAL at 10:01

## 2022-09-29 RX ADMIN — LISINOPRIL 20 MILLIGRAM(S): 2.5 TABLET ORAL at 10:02

## 2022-09-29 RX ADMIN — SENNA PLUS 2 TABLET(S): 8.6 TABLET ORAL at 21:35

## 2022-09-29 RX ADMIN — Medication 1 MILLIGRAM(S): at 10:01

## 2022-09-29 RX ADMIN — Medication 1 TABLET(S): at 10:01

## 2022-09-29 RX ADMIN — PANTOPRAZOLE SODIUM 40 MILLIGRAM(S): 20 TABLET, DELAYED RELEASE ORAL at 10:00

## 2022-09-29 RX ADMIN — Medication 975 MILLIGRAM(S): at 21:34

## 2022-09-29 RX ADMIN — Medication 500 MILLIGRAM(S): at 10:02

## 2022-09-29 NOTE — PROGRESS NOTE ADULT - SUBJECTIVE AND OBJECTIVE BOX
HPI:  PCP: DR. MINERVA Dooley      HPI:  this is an 89 yo female with pmh: HTN, HLD, hypothyroidism, who was visiting her son for the  holiday and while making coffee turned and caught her foot on part of the floor causing her to trip and fall, witnessed by son.  Pt denies any head strike or any dizziness or lightheadedness prior to falling, denies any recent f/c/r, denies and burning or frequency of urination.  She is not on any anticoagulation meds.  Denies any issues with anesthesia, denies any cardiac issues, states she follows regularly with her PCP. She exercises regularly, was in a exercise class prior to COVID, no longer attends but walk around her block regularly, denies any CP or SOB while doing so,  Imaging + for right IT fx, plan for OR today.     PMH: as above  PSH: "Ulcer surgery," colonscopy, upper endo  FH: mother dec: age 91: MI, Father dec: age 91: MI  Social history: former smoker, quit years ago, rare alcohol, no rec drugs  Allergies NKDA   (27 Sep 2022 09:28)      Patient is a 88y old  Female who presents with a chief complaint of mechanical fall, right hip pain (28 Sep 2022 09:22)      Consulted by Dr. Aviva Lucero   for VTE prophylaxis, risk stratification, and anticoagulation management.    PAST MEDICAL & SURGICAL HISTORY:    Interval History  2022:patient seen at bedside discussed the necessity of anticoagulation with Lovenox risks and benefits explained , patient denies any h/o vte,stroke or bleeding will continue on Lovenox patient in agreement   Pt was having decreased o2 sat an ra 88-89%.  Pt seen at bedside with Daughter Yisel present.  Discussed the CT  angio RESULTS of a right upper lobe PE.  Discussed her options with anticoagulation Eliquis. Pt and daughter states they do not know if pt was ever dx with PEs before.  They wish to speak with her primary Dr Dooley ? first name before starting any anticoagulation.  They also stated the hospitalist here has not informed them yet of the pe.  I made them aware that once they decide what action they wish to take I will take care of order the ac. Literature given for Eliquis benefits and risks discuss.          CrCl:50.5  BMI:31.9  EBL:50ml    Caprini VTE Risk Score:CAPRINI SCORE  AGE RELATED RISK FACTORS                                                       MOBILITY RELATED FACTORS  [ ] Age 41-60 years                                            (1 Point)                  [ ] Bed rest /restricted mobility                             (1 Point)  [ ] Age: 61-74 years                                           (2 Points)                [ ] Plaster cast                                                   (2 Points)  [x ] Age= 75 years                                              (3 Points)                 [ ] Bed bound for more than 72 hours                   (2 Points)    DISEASE RELATED RISK FACTORS                                               GENDER SPECIFIC FACTORS  [ ] Edema in the lower extremities                       (1 Point)           [ ] Pregnancy                                                            (1 Point)  [ ] Varicose veins                                               (1 Point)                  [ ] Post-partum < 6 weeks                                      (1 Point)             [ x] BMI > 25 Kg/m2                                            (1 Point)                  [ ] Hormonal therapy or oral contraception       (1 Point)                 [ ] Sepsis (in the previous month)                        (1 Point)             [ ] History of pregnancy complications                (1Point)  [ ] Pneumonia or serious lung disease                                             [ ] Unexplained or recurrent  (=/>3), premature                                 (In the previous month)                               (1 Point)                birth with toxemia or growth-restricted infant (1 Point)  [ ] Abnormal pulmonary function test            (1 Point)                                   SURGERY RELATED RISK FACTORS  [ ] Acute myocardial infarction                       (1 Point)                  [ ]  Section                                         (1 Point)  [ ] Congestive heart failure (in the previous month) (1 Point)   [ ] Minor surgery   lasting <45 minutes       (1 Point)   [ ] Inflammatory bowel disease                             (1 Point)          [ ] Arthroscopic surgery                                  (2 Points)  [ ] Central venous access                                    (2 Points)            [ ] General surgery lasting >45 minutes      (2 Points)       [ ] Stroke (in the previous month)                  (5 Points)            [ ] Elective major lower extremity arthroplasty (5 Points)                                   [  ] Malignancy (present or past include skin melanoma                                          but exclude  basal skin cell)    (2 points)                                      TRAUMA RELATED RISK FACTORS                HEMATOLOGY RELATED FACTORS                                  [x ] Fracture of the hip, pelvis, or leg                       (5 Points)  [ ] Prior episodes of VTE                                     (3 Points)          [ ] Acute spinal cord injury (in the previous month)  (5 Points)  [ ] Positive family history for VTE                         (3 Points)       [ ] Paralysis (less than 1 month)                          (5 Points)  [ ] Prothrombin 89822 A                                      (3 Points)         [ ] Multiple Trauma (within 1month)                 (5Points)                                                                                                                                                                [ ] Factor V Leiden                                          (3 Points)                                OTHER RISK FACTORS                          [ ] Lupus anticoagulants                                     (3 Points)                       [ ] BMI > 40                          (1 Point)                                                         [ ] Anticardiolipin antibodies                                (3 Points)                   [ ] Smoking                              (1Point)                                                [ ] High homocysteine in the blood                      (3 Points)                [  ] Diabetes requiring insulin (1point)                         [ ] Other congenital or acquired thrombophilia       (3 Points)          [  ] Chemotherapy                   (1 Point)  [ ] Heparin induced thrombocytopenia                  (3 Points)             [  ] Blood Transfusion                (1 point)                                                                                                             Total Score [   9       ]                                                                                                                                                                                                                                                                                                                                                                                                                                         IMPROVE Bleeding Risk Score:3.5        Falls Risk:   High (x  )  Mod (  )  Low (  )      FAMILY HISTORY:  No pertinent family history in first degree relatives      Denies any personal or familial history of clotting or bleeding disorders.    Allergies    No Known Allergies    Intolerances        REVIEW OF SYSTEMS    (  )Fever	     (  )Constipation	(  )SOB				(  )Headache	(  )Dysuria  (  )Chills	     (  )Melena	(  )Dyspnea present on exertion	                    (  )Dizziness                    (  )Polyuria  (  )Nausea	     (  )Hematochezia	(  )Cough			                    (  )Syncope   	(  )Hematuria  (  )Vomiting    (  )Chest Pain	(  )Wheezing			(  )Weakness  (  )Diarrhea     (  )Palpitations	(  )Anorexia			( x )joint pain    All  other review of systems negative: Yes    Vital Signs Last 24 Hrs  T(C): 37 (22 @ 08:34), Max: 37.4 (22 @ 20:17)  T(F): 98.6 (22 @ 08:34), Max: 99.3 (22 @ 20:17)  HR: 100 (22 @ 08:34) (95 - 100)  BP: 129/59 (22 @ 08:34) (119/65 - 129/59)  BP(mean): --  RR: 18 (22 @ 08:34) (18 - 18)  SpO2: 92% (22 @ 08:34) (92% - 95%)    PHYSICAL EXAM:    Constitutional: Appears Well    Neurological: A& O x 3    Skin: Warm    Respiratory and Thorax: normal effort; Breath sounds: normal; No rales/wheezing/rhonchi  	  Cardiovascular: S1, S2, regular, NMBR	    Gastrointestinal: BS + x 4Q, nontender	    Genitourinary:  Bladder nondistended, nontender    Musculoskeletal:   General Right:   + muscle/joint tenderness,   normal tone, no joint swelling,   ROM: limited	    General Left:   no muscle/joint tenderness,   normal tone, no joint swelling,   ROM: full    Hip:  Right: Dressing CDI;                 Lower extrems:   Right: no calf tenderness              negative angie's sign               + pedal pulses    Left:   no calf tenderness              negative angie's sign               + pedal pulses                                   10.0   11.78 )-----------( 204      ( 29 Sep 2022 07:25 )             30.4       09-29    140  |  109<H>  |  22  ----------------------------<  129<H>  3.7   |  25  |  0.86    Ca    8.6      29 Sep 2022 07:25                     10.2   13.07 )-----------( 222      ( 28 Sep 2022 08:39 )             31.3       09-28    140  |  109<H>  |  18  ----------------------------<  151<H>  4.0   |  23  |  0.95    Ca    8.6      28 Sep 2022 08:39    TPro  x   /  Alb  3.4  /  TBili  x   /  DBili  x   /  AST  x   /  ALT  x   /  AlkPhos  x         PT/INR - ( 27 Sep 2022 04:44 )   PT: 12.2 sec;   INR: 1.05 ratio         PTT - ( 27 Sep 2022 04:44 )  PTT:26.0 sec				    MEDICATIONS  (STANDING):  acetaminophen     Tablet .. 975 milliGRAM(s) Oral every 8 hours  amLODIPine   Tablet 10 milliGRAM(s) Oral daily  apixaban 10 milliGRAM(s) Oral two times a day  ascorbic acid 500 milliGRAM(s) Oral two times a day  celecoxib 200 milliGRAM(s) Oral every 12 hours  cholecalciferol 2000 Unit(s) Oral daily  ferrous    sulfate 325 milliGRAM(s) Oral daily  folic acid 1 milliGRAM(s) Oral daily  lactated ringers. 1000 milliLiter(s) IV Continuous <Continuous>  lactated ringers. 1000 milliLiter(s) IV Continuous <Continuous>  levothyroxine 75 MICROGram(s) Oral daily  lisinopril 20 milliGRAM(s) Oral daily  multivitamin 1 Tablet(s) Oral daily  pantoprazole    Tablet 40 milliGRAM(s) Oral before breakfast  polyethylene glycol 3350 17 Gram(s) Oral daily  senna 2 Tablet(s) Oral at bedtime  simvastatin 40 milliGRAM(s) Oral at bedtime  sodium chloride 0.9%. 1000 milliLiter(s) IV Continuous <Continuous>      < from: Xray Femur 2 Views, Right (22 @ 03:04) >  IMPRESSION: Intertrochanteric fracture right hip.    Question possible lytic area in 1 of the femoral condyles. Suggest   specific right knee views.    < end of copied text >  < from: CT Angio Chest PE Protocol w/ IV Cont (22 @ 11:26) >  IMPRESSION:    Age-indeterminate pulmonary embolism within a subsegmental branch of the   right upper lobe. This was discussed with Dr. Dooley at 12:05PM at   2022.    < end of copied text >      DVT Prophylaxis:  LMWH                   (  )  Heparin SQ           (  )  Coumadin             (  )  Xarelto                  (  )  Eliquis                   ( x )  Venodynes           (x  )  Ambulation          ( x )  UFH                       (  )  Contraindicated  (  )  EC ASPIRIN       (  )             HPI:  PCP: DR. MINERVA Dooley      HPI:  this is an 87 yo female with pmh: HTN, HLD, hypothyroidism, who was visiting her son for the  holiday and while making coffee turned and caught her foot on part of the floor causing her to trip and fall, witnessed by son.  Pt denies any head strike or any dizziness or lightheadedness prior to falling, denies any recent f/c/r, denies and burning or frequency of urination.  She is not on any anticoagulation meds.  Denies any issues with anesthesia, denies any cardiac issues, states she follows regularly with her PCP. She exercises regularly, was in a exercise class prior to COVID, no longer attends but walk around her block regularly, denies any CP or SOB while doing so,  Imaging + for right IT fx, plan for OR today.     PMH: as above  PSH: "Ulcer surgery," colonscopy, upper endo  FH: mother dec: age 91: MI, Father dec: age 91: MI  Social history: former smoker, quit years ago, rare alcohol, no rec drugs  Allergies NKDA   (27 Sep 2022 09:28)      Patient is a 88y old  Female who presents with a chief complaint of mechanical fall, right hip pain (28 Sep 2022 09:22)      Consulted by Dr. Aviva Lucero   for VTE prophylaxis, risk stratification, and anticoagulation management.    PAST MEDICAL & SURGICAL HISTORY:    Interval History  2022:patient seen at bedside discussed the necessity of anticoagulation with Lovenox risks and benefits explained , patient denies any h/o vte,stroke or bleeding will continue on Lovenox patient in agreement   Pt was having decreased o2 sat an ra 88-89%.  Pt seen at bedside with Daughter Yisel present.  Discussed the CT  angio RESULTS of a right upper lobe PE.  Discussed her options with anticoagulation Eliquis. Pt and daughter states they do not know if pt was ever dx with PEs before.  They wish to speak with her primary Dr Dooley ? first name before starting any anticoagulation.  They also stated the hospitalist here has not informed them yet of the pe.  I made them aware that once they decide what action they wish to take I will take care of order the ac. Literature given for Eliquis benefits and risks discuss.          CrCl:50.5  BMI:31.9  EBL:50ml    Caprini VTE Risk Score:CAPRINI SCORE  AGE RELATED RISK FACTORS                                                       MOBILITY RELATED FACTORS  [ ] Age 41-60 years                                            (1 Point)                  [ ] Bed rest /restricted mobility                             (1 Point)  [ ] Age: 61-74 years                                           (2 Points)                [ ] Plaster cast                                                   (2 Points)  [x ] Age= 75 years                                              (3 Points)                 [ ] Bed bound for more than 72 hours                   (2 Points)    DISEASE RELATED RISK FACTORS                                               GENDER SPECIFIC FACTORS  [ ] Edema in the lower extremities                       (1 Point)           [ ] Pregnancy                                                            (1 Point)  [ ] Varicose veins                                               (1 Point)                  [ ] Post-partum < 6 weeks                                      (1 Point)             [ x] BMI > 25 Kg/m2                                            (1 Point)                  [ ] Hormonal therapy or oral contraception       (1 Point)                 [ ] Sepsis (in the previous month)                        (1 Point)             [ ] History of pregnancy complications                (1Point)  [ ] Pneumonia or serious lung disease                                             [ ] Unexplained or recurrent  (=/>3), premature                                 (In the previous month)                               (1 Point)                birth with toxemia or growth-restricted infant (1 Point)  [ ] Abnormal pulmonary function test            (1 Point)                                   SURGERY RELATED RISK FACTORS  [ ] Acute myocardial infarction                       (1 Point)                  [ ]  Section                                         (1 Point)  [ ] Congestive heart failure (in the previous month) (1 Point)   [ ] Minor surgery   lasting <45 minutes       (1 Point)   [ ] Inflammatory bowel disease                             (1 Point)          [ ] Arthroscopic surgery                                  (2 Points)  [ ] Central venous access                                    (2 Points)            [ ] General surgery lasting >45 minutes      (2 Points)       [ ] Stroke (in the previous month)                  (5 Points)            [ ] Elective major lower extremity arthroplasty (5 Points)                                   [  ] Malignancy (present or past include skin melanoma                                          but exclude  basal skin cell)    (2 points)                                      TRAUMA RELATED RISK FACTORS                HEMATOLOGY RELATED FACTORS                                  [x ] Fracture of the hip, pelvis, or leg                       (5 Points)  [ ] Prior episodes of VTE                                     (3 Points)          [ ] Acute spinal cord injury (in the previous month)  (5 Points)  [ ] Positive family history for VTE                         (3 Points)       [ ] Paralysis (less than 1 month)                          (5 Points)  [ ] Prothrombin 67461 A                                      (3 Points)         [ ] Multiple Trauma (within 1month)                 (5Points)                                                                                                                                                                [ ] Factor V Leiden                                          (3 Points)                                OTHER RISK FACTORS                          [ ] Lupus anticoagulants                                     (3 Points)                       [ ] BMI > 40                          (1 Point)                                                         [ ] Anticardiolipin antibodies                                (3 Points)                   [ ] Smoking                              (1Point)                                                [ ] High homocysteine in the blood                      (3 Points)                [  ] Diabetes requiring insulin (1point)                         [ ] Other congenital or acquired thrombophilia       (3 Points)          [  ] Chemotherapy                   (1 Point)  [ ] Heparin induced thrombocytopenia                  (3 Points)             [  ] Blood Transfusion                (1 point)                                                                                                             Total Score [   9       ]                                                                                                                                                                                                                                                                                                                                                                                                                                         IMPROVE Bleeding Risk Score:3.5        Falls Risk:   High (x  )  Mod (  )  Low (  )      FAMILY HISTORY:  No pertinent family history in first degree relatives      Denies any personal or familial history of clotting or bleeding disorders.    Allergies    No Known Allergies    Intolerances        REVIEW OF SYSTEMS    (  )Fever	     (  )Constipation	(  )SOB				(  )Headache	(  )Dysuria  (  )Chills	     (  )Melena	(  )Dyspnea present on exertion	                    (  )Dizziness                    (  )Polyuria  (  )Nausea	     (  )Hematochezia	(  )Cough			                    (  )Syncope   	(  )Hematuria  (  )Vomiting    (  )Chest Pain	(  )Wheezing			(  )Weakness  (  )Diarrhea     (  )Palpitations	(  )Anorexia			( x )joint pain    All  other review of systems negative: Yes    Vital Signs Last 24 Hrs  T(C): 37 (22 @ 08:34), Max: 37.4 (22 @ 20:17)  T(F): 98.6 (22 @ 08:34), Max: 99.3 (22 @ 20:17)  HR: 100 (22 @ 08:34) (95 - 100)  BP: 129/59 (22 @ 08:34) (119/65 - 129/59)  BP(mean): --  RR: 18 (22 @ 08:34) (18 - 18)  SpO2: 92% (22 @ 08:34) (92% - 95%)    PHYSICAL EXAM:    Constitutional: Appears Well    Neurological: A& O x 3    Skin: Warm    Respiratory and Thorax: normal effort; Breath sounds: normal; No rales/wheezing/rhonchi  	  Cardiovascular: S1, S2, regular, NMBR	    Gastrointestinal: BS + x 4Q, nontender, noted large hernia mid abd.    Genitourinary:  Bladder nondistended, nontender    Musculoskeletal:   General Right:   + muscle/joint tenderness,   normal tone, no joint swelling,   ROM: limited	    General Left:   no muscle/joint tenderness,   normal tone, no joint swelling,   ROM: full    Hip:  Right: Dressing CDI;                 Lower extrems:   Right: no calf tenderness              negative angie's sign               + pedal pulses    Left:   no calf tenderness              negative angie's sign               + pedal pulses                                   10.0   11.78 )-----------( 204      ( 29 Sep 2022 07:25 )             30.4       09-    140  |  109<H>  |  22  ----------------------------<  129<H>  3.7   |  25  |  0.86    Ca    8.6      29 Sep 2022 07:25                     10.2   13.07 )-----------( 222      ( 28 Sep 2022 08:39 )             31.3       09-    140  |  109<H>  |  18  ----------------------------<  151<H>  4.0   |  23  |  0.95    Ca    8.6      28 Sep 2022 08:39    TPro  x   /  Alb  3.4  /  TBili  x   /  DBili  x   /  AST  x   /  ALT  x   /  AlkPhos  x         PT/INR - ( 27 Sep 2022 04:44 )   PT: 12.2 sec;   INR: 1.05 ratio         PTT - ( 27 Sep 2022 04:44 )  PTT:26.0 sec				    MEDICATIONS  (STANDING):  acetaminophen     Tablet .. 975 milliGRAM(s) Oral every 8 hours  amLODIPine   Tablet 10 milliGRAM(s) Oral daily  apixaban 10 milliGRAM(s) Oral two times a day  ascorbic acid 500 milliGRAM(s) Oral two times a day  celecoxib 200 milliGRAM(s) Oral every 12 hours  cholecalciferol 2000 Unit(s) Oral daily  ferrous    sulfate 325 milliGRAM(s) Oral daily  folic acid 1 milliGRAM(s) Oral daily  lactated ringers. 1000 milliLiter(s) IV Continuous <Continuous>  lactated ringers. 1000 milliLiter(s) IV Continuous <Continuous>  levothyroxine 75 MICROGram(s) Oral daily  lisinopril 20 milliGRAM(s) Oral daily  multivitamin 1 Tablet(s) Oral daily  pantoprazole    Tablet 40 milliGRAM(s) Oral before breakfast  polyethylene glycol 3350 17 Gram(s) Oral daily  senna 2 Tablet(s) Oral at bedtime  simvastatin 40 milliGRAM(s) Oral at bedtime  sodium chloride 0.9%. 1000 milliLiter(s) IV Continuous <Continuous>      < from: Xray Femur 2 Views, Right (22 @ 03:04) >  IMPRESSION: Intertrochanteric fracture right hip.    Question possible lytic area in 1 of the femoral condyles. Suggest   specific right knee views.    < end of copied text >  < from: CT Angio Chest PE Protocol w/ IV Cont (22 @ 11:26) >  IMPRESSION:    Age-indeterminate pulmonary embolism within a subsegmental branch of the   right upper lobe. This was discussed with Dr. Dooley at 12:05PM at   2022.    < end of copied text >      DVT Prophylaxis:  LMWH                   (  )  Heparin SQ           (  )  Coumadin             (  )  Xarelto                  (  )  Eliquis                   ( x )  Venodynes           (x  )  Ambulation          ( x )  UFH                       (  )  Contraindicated  (  )  EC ASPIRIN       (  )

## 2022-09-29 NOTE — PROGRESS NOTE ADULT - SUBJECTIVE AND OBJECTIVE BOX
Patient seen and examined at bedside. Pain is controlled. Patient denies any numbness, tingling, weakness, or any other orthopaedic complaint.    Exam:  Vital Signs Last 24 Hrs  T(C): 37.2 (29 Sep 2022 00:38), Max: 37.4 (28 Sep 2022 20:17)  T(F): 98.9 (29 Sep 2022 00:38), Max: 99.3 (28 Sep 2022 20:17)  HR: 99 (29 Sep 2022 00:38) (95 - 99)  BP: 120/57 (29 Sep 2022 00:38) (119/65 - 132/57)  BP(mean): --  RR: 18 (29 Sep 2022 00:38) (17 - 18)  SpO2: 95% (29 Sep 2022 00:38) (92% - 95%)    Parameters below as of 29 Sep 2022 00:38  Patient On (Oxygen Delivery Method): nasal cannula  O2 Flow (L/min): 4    Gen: Resting in bed, no acute distress  RLE  Dressing in place, clean/dry/intact.   Madalyn-incisional tenderness to palpation; otherwise, NTTP throughout the rest of the extremity.   SILT L2-S1.  GSC/TA/EHL/FHL intact. Q/H unable to assess 2' pain.   DP pulse palpable.   No calf tenderness bilaterally.   Compartments soft and compressible.                                    10.2   13.07 )-----------( 222      ( 28 Sep 2022 08:39 )             31.3   09-28    140  |  109<H>  |  18  ----------------------------<  151<H>  4.0   |  23  |  0.95    Ca    8.6      28 Sep 2022 08:39    TPro  x   /  Alb  3.4  /  TBili  x   /  DBili  x   /  AST  x   /  ALT  x   /  AlkPhos  x   09-27    Assessment and Plan:  88y Female POD2 R IMN    WBAT/PT/OT  Pain management PRN  Continue prophylactic antibiotics  DVT PPx: heparin, appreciate anticoagulation team recommendations.   Incentive spirometry  Dispo: [facility] per PT  Will discuss with Dr. Chicas and advise of any changes to the plan.

## 2022-09-29 NOTE — PROGRESS NOTE ADULT - SUBJECTIVE AND OBJECTIVE BOX
C/c: mechanical fall/right hip pain    HPI: 88F, pmh of HTN, HLD, hypothyroidism, who was visiting her son for the [place] holiday and while making coffee turned and caught her foot on part of the floor causing her to trip and fall, witnessed by son.  She was admitted with right hip fracture.   Underwent IM nail 9/27.     pt seen and examined this am. spo2 70s without nasal cannula. Denies sob/chest pain. No cough/sputum. No f/c/r.   CTA + for age indeterminant RUL PE.   No previous h/o dvt/pe.   Right hip pain controlled.   no n/v today. no difficulty voiding. +poor appetite      Review of system- All 10 systems reviewed and is as per HPI otherwise negative.     Vital Signs Last 24 Hrs  T(C): 37 (29 Sep 2022 08:34), Max: 37.4 (28 Sep 2022 20:17)  T(F): 98.6 (29 Sep 2022 08:34), Max: 99.3 (28 Sep 2022 20:17)  HR: 100 (29 Sep 2022 08:34) (95 - 100)  BP: 129/59 (29 Sep 2022 08:34) (119/65 - 129/59)  RR: 18 (29 Sep 2022 08:34) (18 - 18)  SpO2: 92% (29 Sep 2022 08:34) (92% - 95%)    Parameters below as of 29 Sep 2022 08:34  Patient On (Oxygen Delivery Method): nasal cannula  O2 Flow (L/min): 4      PHYSICAL EXAM:    GENERAL: Comfortable, obese, pleasant, no acute distress  HEAD:  Atraumatic, Normocephalic  EYES: EOMI, PERRLA  HEENT: Moist mucous membranes  NECK: Supple, No JVD  NERVOUS SYSTEM:  Alert & Oriented X3, Motor Strength 5/5 B/L upper and lower extremities  CHEST/LUNG: decreased bs at bases bilaterlaly.   HEART: Regular rate and rhythm  ABDOMEN: Soft, Nontender, Nondistended; Bowel sounds present  GENITOURINARY- Voiding, no palpable bladder  EXTREMITIES:  No clubbing, cyanosis, or edema  MUSCULOSKELETAL- Right hip dressing c/d/i.  SKIN-no rash      LABS:                        10.2   13.07 )-----------( 222      ( 28 Sep 2022 08:39 )             31.3     09-28    140  |  109<H>  |  18  ----------------------------<  151<H>  4.0   |  23  |  0.95    Ca    8.6      28 Sep 2022 08:39    TPro  x   /  Alb  3.4  /  TBili  x   /  DBili  x   /  AST  x   /  ALT  x   /  AlkPhos  x   09-27    PT/INR - ( 27 Sep 2022 04:44 )   PT: 12.2 sec;   INR: 1.05 ratio         PTT - ( 27 Sep 2022 04:44 )  PTT:26.0 sec      MEDICATIONS  (STANDING):  acetaminophen     Tablet .. 975 milliGRAM(s) Oral every 8 hours  amLODIPine   Tablet 10 milliGRAM(s) Oral daily  apixaban 10 milliGRAM(s) Oral two times a day  ascorbic acid 500 milliGRAM(s) Oral two times a day  celecoxib 200 milliGRAM(s) Oral every 12 hours  cholecalciferol 2000 Unit(s) Oral daily  ferrous    sulfate 325 milliGRAM(s) Oral daily  folic acid 1 milliGRAM(s) Oral daily  lactated ringers. 1000 milliLiter(s) (75 mL/Hr) IV Continuous <Continuous>  lactated ringers. 1000 milliLiter(s) (75 mL/Hr) IV Continuous <Continuous>  levothyroxine 75 MICROGram(s) Oral daily  lisinopril 20 milliGRAM(s) Oral daily  multivitamin 1 Tablet(s) Oral daily  pantoprazole    Tablet 40 milliGRAM(s) Oral before breakfast  polyethylene glycol 3350 17 Gram(s) Oral daily  senna 2 Tablet(s) Oral at bedtime  simvastatin 40 milliGRAM(s) Oral at bedtime  sodium chloride 0.9%. 1000 milliLiter(s) (125 mL/Hr) IV Continuous <Continuous>    MEDICATIONS  (PRN):  bisacodyl Suppository 10 milliGRAM(s) Rectal once PRN Constipation  HYDROmorphone  Injectable 0.5 milliGRAM(s) SubCutaneous every 4 hours PRN Severe Pain (7 - 10)  morphine  - Injectable 2 milliGRAM(s) IV Push every 5 minutes PRN Chest Pain  ondansetron Injectable 8 milliGRAM(s) IV Push every 8 hours PRN Nausea and/or Vomiting  oxyCODONE    IR 5 milliGRAM(s) Oral every 4 hours PRN Mild Pain (1 - 3)  oxyCODONE    IR 10 milliGRAM(s) Oral every 4 hours PRN Moderate Pain (4 - 6)  prochlorperazine   Injectable 5 milliGRAM(s) IV Push every 8 hours PRN Nausea and/or Vomiting  traMADol 50 milliGRAM(s) Oral every 4 hours PRN Mild Pain (1 - 3)  ontinuous>        ASSESSMENT AND PLAN:  83f, PMH as above a/w    1. Mechanical fall/right hip fx:  -s/p IM nail POD#2  -pain control  -physical therapy  -incentive spirometry  -dc celebrex as pt to start eliquis.    2. Acute hypoxic respiratory failure:  -d/t PE+ atelectasis.   -would anticoagulate  -d/w AC services, start eliquis.   -check LE dopplers    3. Acute blood loss anemia:  -d/t surgery  -no need for transfusion at this time.     4. HTN:  -continue norvasc/lisinopril   -bp stable.     5. HLD:  -statin.     6. Hypothyroid:  -continue synthroid.     7. DVT px  -lovenox per a/c services.     dispo:  [facility] tomorrow if h/h stable.

## 2022-09-30 ENCOUNTER — TRANSCRIPTION ENCOUNTER (OUTPATIENT)
Age: 87
End: 2022-09-30

## 2022-09-30 VITALS
DIASTOLIC BLOOD PRESSURE: 56 MMHG | HEART RATE: 100 BPM | SYSTOLIC BLOOD PRESSURE: 114 MMHG | TEMPERATURE: 99 F | OXYGEN SATURATION: 92 % | RESPIRATION RATE: 16 BRPM

## 2022-09-30 DIAGNOSIS — I26.99 OTHER PULMONARY EMBOLISM WITHOUT ACUTE COR PULMONALE: ICD-10-CM

## 2022-09-30 LAB
ANION GAP SERPL CALC-SCNC: 7 MMOL/L — SIGNIFICANT CHANGE UP (ref 5–17)
BUN SERPL-MCNC: 35 MG/DL — HIGH (ref 7–23)
CALCIUM SERPL-MCNC: 8.7 MG/DL — SIGNIFICANT CHANGE UP (ref 8.5–10.1)
CHLORIDE SERPL-SCNC: 110 MMOL/L — HIGH (ref 96–108)
CO2 SERPL-SCNC: 25 MMOL/L — SIGNIFICANT CHANGE UP (ref 22–31)
CREAT SERPL-MCNC: 1 MG/DL — SIGNIFICANT CHANGE UP (ref 0.5–1.3)
EGFR: 54 ML/MIN/1.73M2 — LOW
GLUCOSE SERPL-MCNC: 130 MG/DL — HIGH (ref 70–99)
HCT VFR BLD CALC: 28.1 % — LOW (ref 34.5–45)
HGB BLD-MCNC: 9.3 G/DL — LOW (ref 11.5–15.5)
MCHC RBC-ENTMCNC: 32.6 PG — SIGNIFICANT CHANGE UP (ref 27–34)
MCHC RBC-ENTMCNC: 33.1 GM/DL — SIGNIFICANT CHANGE UP (ref 32–36)
MCV RBC AUTO: 98.6 FL — SIGNIFICANT CHANGE UP (ref 80–100)
PLATELET # BLD AUTO: 245 K/UL — SIGNIFICANT CHANGE UP (ref 150–400)
POTASSIUM SERPL-MCNC: 3.7 MMOL/L — SIGNIFICANT CHANGE UP (ref 3.5–5.3)
POTASSIUM SERPL-SCNC: 3.7 MMOL/L — SIGNIFICANT CHANGE UP (ref 3.5–5.3)
RBC # BLD: 2.85 M/UL — LOW (ref 3.8–5.2)
RBC # FLD: 14.9 % — HIGH (ref 10.3–14.5)
SODIUM SERPL-SCNC: 142 MMOL/L — SIGNIFICANT CHANGE UP (ref 135–145)
WBC # BLD: 10.8 K/UL — HIGH (ref 3.8–10.5)
WBC # FLD AUTO: 10.8 K/UL — HIGH (ref 3.8–10.5)

## 2022-09-30 PROCEDURE — 99231 SBSQ HOSP IP/OBS SF/LOW 25: CPT

## 2022-09-30 PROCEDURE — 99239 HOSP IP/OBS DSCHRG MGMT >30: CPT

## 2022-09-30 RX ORDER — APIXABAN 2.5 MG/1
2 TABLET, FILM COATED ORAL
Qty: 70 | Refills: 0
Start: 2022-09-30 | End: 2022-10-06

## 2022-09-30 RX ORDER — CEFEPIME 1 G/1
1000 INJECTION, POWDER, FOR SOLUTION INTRAMUSCULAR; INTRAVENOUS ONCE
Refills: 0 | Status: DISCONTINUED | OUTPATIENT
Start: 2022-09-30 | End: 2022-09-30

## 2022-09-30 RX ORDER — POLYETHYLENE GLYCOL 3350 17 G/17G
17 POWDER, FOR SOLUTION ORAL
Qty: 0 | Refills: 0 | DISCHARGE
Start: 2022-09-30

## 2022-09-30 RX ORDER — CEFEPIME 1 G/1
1000 INJECTION, POWDER, FOR SOLUTION INTRAMUSCULAR; INTRAVENOUS EVERY 8 HOURS
Refills: 0 | Status: DISCONTINUED | OUTPATIENT
Start: 2022-09-30 | End: 2022-09-30

## 2022-09-30 RX ORDER — CEFEPIME 1 G/1
INJECTION, POWDER, FOR SOLUTION INTRAMUSCULAR; INTRAVENOUS
Refills: 0 | Status: DISCONTINUED | OUTPATIENT
Start: 2022-09-30 | End: 2022-09-30

## 2022-09-30 RX ORDER — METOPROLOL TARTRATE 50 MG
1 TABLET ORAL
Qty: 0 | Refills: 0 | DISCHARGE

## 2022-09-30 RX ORDER — OXYCODONE HYDROCHLORIDE 5 MG/1
1 TABLET ORAL
Qty: 0 | Refills: 0 | DISCHARGE
Start: 2022-09-30

## 2022-09-30 RX ORDER — FERROUS SULFATE 325(65) MG
1 TABLET ORAL
Qty: 0 | Refills: 0 | DISCHARGE
Start: 2022-09-30

## 2022-09-30 RX ORDER — LISINOPRIL 2.5 MG/1
1 TABLET ORAL
Qty: 0 | Refills: 0 | DISCHARGE
Start: 2022-09-30

## 2022-09-30 RX ORDER — PANTOPRAZOLE SODIUM 20 MG/1
1 TABLET, DELAYED RELEASE ORAL
Qty: 0 | Refills: 0 | DISCHARGE
Start: 2022-09-30

## 2022-09-30 RX ORDER — TRAMADOL HYDROCHLORIDE 50 MG/1
1 TABLET ORAL
Qty: 0 | Refills: 0 | DISCHARGE
Start: 2022-09-30

## 2022-09-30 RX ORDER — CELECOXIB 200 MG/1
1 CAPSULE ORAL
Qty: 0 | Refills: 0 | DISCHARGE
Start: 2022-09-30

## 2022-09-30 RX ORDER — BENAZEPRIL HYDROCHLORIDE 40 MG/1
1 TABLET ORAL
Qty: 0 | Refills: 0 | DISCHARGE

## 2022-09-30 RX ADMIN — TRAMADOL HYDROCHLORIDE 50 MILLIGRAM(S): 50 TABLET ORAL at 13:22

## 2022-09-30 RX ADMIN — Medication 1 TABLET(S): at 09:34

## 2022-09-30 RX ADMIN — POLYETHYLENE GLYCOL 3350 17 GRAM(S): 17 POWDER, FOR SOLUTION ORAL at 09:36

## 2022-09-30 RX ADMIN — Medication 975 MILLIGRAM(S): at 06:05

## 2022-09-30 RX ADMIN — Medication 500 MILLIGRAM(S): at 09:35

## 2022-09-30 RX ADMIN — TRAMADOL HYDROCHLORIDE 50 MILLIGRAM(S): 50 TABLET ORAL at 14:01

## 2022-09-30 RX ADMIN — Medication 975 MILLIGRAM(S): at 14:01

## 2022-09-30 RX ADMIN — CELECOXIB 200 MILLIGRAM(S): 200 CAPSULE ORAL at 09:35

## 2022-09-30 RX ADMIN — Medication 2000 UNIT(S): at 09:35

## 2022-09-30 RX ADMIN — TRAMADOL HYDROCHLORIDE 50 MILLIGRAM(S): 50 TABLET ORAL at 03:34

## 2022-09-30 RX ADMIN — Medication 975 MILLIGRAM(S): at 13:23

## 2022-09-30 RX ADMIN — APIXABAN 10 MILLIGRAM(S): 2.5 TABLET, FILM COATED ORAL at 09:35

## 2022-09-30 RX ADMIN — Medication 75 MICROGRAM(S): at 06:05

## 2022-09-30 RX ADMIN — Medication 1 MILLIGRAM(S): at 09:33

## 2022-09-30 RX ADMIN — PANTOPRAZOLE SODIUM 40 MILLIGRAM(S): 20 TABLET, DELAYED RELEASE ORAL at 06:05

## 2022-09-30 RX ADMIN — Medication 325 MILLIGRAM(S): at 09:34

## 2022-09-30 RX ADMIN — LISINOPRIL 20 MILLIGRAM(S): 2.5 TABLET ORAL at 09:37

## 2022-09-30 RX ADMIN — AMLODIPINE BESYLATE 10 MILLIGRAM(S): 2.5 TABLET ORAL at 09:34

## 2022-09-30 RX ADMIN — TRAMADOL HYDROCHLORIDE 50 MILLIGRAM(S): 50 TABLET ORAL at 03:07

## 2022-09-30 NOTE — PROGRESS NOTE ADULT - PROVIDER SPECIALTY LIST ADULT
Orthopedics
Anticoag Management
Hospitalist
Orthopedics
Hospitalist
Orthopedics
Anticoag Management
Orthopedics

## 2022-09-30 NOTE — PROGRESS NOTE ADULT - ASSESSMENT
HPI:  This is an 87 yo female with pmh: HTN, HLD, hypothyroidism, s/p fall found to have  right IT fx had IMN done on 9/27/2022  Consulted by Dr. Aviva Lucero   for VTE prophylaxis, risk stratification, and anticoagulation management. Patient is high risk for vte due to age, immobility, surgery, and elevated BMI      9- Pt seen at bedside on [address] after ct angio this a.m which revealed  Age-indeterminate pulmonary embolism within a subsegmental branch of the right upper lobe. Discussed with pt the use of Eliquis as her anticoagulation medication.  Pt was not willing at first then changed her mind and is agreeable to Eliquis.     plan    scont eliquis 10mg po twice a day for 7 days then on pm dose on 10-6-2022 start on 5mg twice a day. rx sent to pharmacy pt and son Giuseppe made aware of the cost of $499 for 60 tabs.   :daily cbc/bmp  :LE Venodynes  : increase mobility as tolerated  :will f/u  :Dispo: rehab excell today    
Plan for IMN R Hip today. Discussed r/b/a w/ pt, pt agrees w/ plan and consents to surgery. Appreciate medicine optimization.
HPI:  This is an 89 yo female with pmh: HTN, HLD, hypothyroidism, s/p fall found to have  right IT fx had IMN done on 9/27/2022  Consulted by Dr. Aviva Lucero   for VTE prophylaxis, risk stratification, and anticoagulation management. Patient is high risk for vte due to age, immobility, surgery, and elevated BMI      9- Pt seen at bedside on [address] after ct angio this a.m which revealed  Age-indeterminate pulmonary embolism within a subsegmental branch of the right upper lobe. Discussed with pt the use of Eliquis as her anticoagulation medication.  Pt was not willing at first then changed her mind and is agreeable to Eliquis.     plan    :dc Lovenox   start pt on eliquis 10mg po twice a day for 7 days then on pm dose on 10-6-2022 start on 5mg twice a day.   :daily cbc/bmp  :LE Venodynes  : increase mobility as tolerated  :will f/u  :Dispo: rehab    
A: 88F s/p right hip IMN     P:  WBAT RLE   therapy   DVT ppx   post op abx  venodynes  incentive spirometer  pain control   follow labs

## 2022-09-30 NOTE — DISCHARGE NOTE NURSING/CASE MANAGEMENT/SOCIAL WORK - PATIENT PORTAL LINK FT
You can access the FollowMyHealth Patient Portal offered by [facility] by registering at the following website: [email or web address]. By joining [employer]’s FollowMyHealth portal, you will also be able to view your health information using other applications (apps) compatible with our system.

## 2022-09-30 NOTE — PROGRESS NOTE ADULT - SUBJECTIVE AND OBJECTIVE BOX
HPI:  PCP: DR. MINERVA Dooley      HPI:  this is an 89 yo female with pmh: HTN, HLD, hypothyroidism, who was visiting her son for the  holiday and while making coffee turned and caught her foot on part of the floor causing her to trip and fall, witnessed by son.  Pt denies any head strike or any dizziness or lightheadedness prior to falling, denies any recent f/c/r, denies and burning or frequency of urination.  She is not on any anticoagulation meds.  Denies any issues with anesthesia, denies any cardiac issues, states she follows regularly with her PCP. She exercises regularly, was in a exercise class prior to COVID, no longer attends but walk around her block regularly, denies any CP or SOB while doing so,  Imaging + for right IT fx, plan for OR today.     PMH: as above  PSH: "Ulcer surgery," colonscopy, upper endo  FH: mother dec: age 91: MI, Father dec: age 91: MI  Social history: former smoker, quit years ago, rare alcohol, no rec drugs  Allergies NKDA   (27 Sep 2022 09:28)      Patient is a 88y old  Female who presents with a chief complaint of mechanical fall, right hip pain (28 Sep 2022 09:22)      Consulted by Dr. Aviva Lucero   for VTE prophylaxis, risk stratification, and anticoagulation management.    PAST MEDICAL & SURGICAL HISTORY:    Interval History  2022:patient seen at bedside discussed the necessity of anticoagulation with Lovenox risks and benefits explained , patient denies any h/o vte,stroke or bleeding will continue on Lovenox patient in agreement   2022 Pt was having decreased o2 sat an ra 88-89%.  Pt seen at bedside with Daughter Yisel present.  Discussed the CT  angio RESULTS of a right upper lobe PE.  Discussed her options with anticoagulation Eliquis. Pt and daughter states they do not know if pt was ever dx with PEs before.  They wish to speak with her primary Dr Dooley ? first name before starting any anticoagulation.  They also stated the hospitalist here has not informed them yet of the pe.  I made them aware that once they decide what action they wish to take I will take care of order the ac. Literature given for Eliquis benefits and risks discuss.    2022 Pt seen at bedside on [facility].   Discussed her anticoagulation with Eliquis.  Pt made aware of cost of $499 for 60 tabs.  I also called pt's son Giuseppe and informed him of the cost and made him aware it is a deductible as far as the cost.  He state they will take care of it, we also discussed other medications for ac and he state he will cont with the Eliquis. Pt to be discharged to rehab today at [facility].           CrCl:50.5  BMI:31.9  EBL:50ml    Caprini VTE Risk Score:CAPRINI SCORE  AGE RELATED RISK FACTORS                                                       MOBILITY RELATED FACTORS  [ ] Age 41-60 years                                            (1 Point)                  [ ] Bed rest /restricted mobility                             (1 Point)  [ ] Age: 61-74 years                                           (2 Points)                [ ] Plaster cast                                                   (2 Points)  [x ] Age= 75 years                                              (3 Points)                 [ ] Bed bound for more than 72 hours                   (2 Points)    DISEASE RELATED RISK FACTORS                                               GENDER SPECIFIC FACTORS  [ ] Edema in the lower extremities                       (1 Point)           [ ] Pregnancy                                                            (1 Point)  [ ] Varicose veins                                               (1 Point)                  [ ] Post-partum < 6 weeks                                      (1 Point)             [ x] BMI > 25 Kg/m2                                            (1 Point)                  [ ] Hormonal therapy or oral contraception       (1 Point)                 [ ] Sepsis (in the previous month)                        (1 Point)             [ ] History of pregnancy complications                (1Point)  [ ] Pneumonia or serious lung disease                                             [ ] Unexplained or recurrent  (=/>3), premature                                 (In the previous month)                               (1 Point)                birth with toxemia or growth-restricted infant (1 Point)  [ ] Abnormal pulmonary function test            (1 Point)                                   SURGERY RELATED RISK FACTORS  [ ] Acute myocardial infarction                       (1 Point)                  [ ]  Section                                         (1 Point)  [ ] Congestive heart failure (in the previous month) (1 Point)   [ ] Minor surgery   lasting <45 minutes       (1 Point)   [ ] Inflammatory bowel disease                             (1 Point)          [ ] Arthroscopic surgery                                  (2 Points)  [ ] Central venous access                                    (2 Points)            [ ] General surgery lasting >45 minutes      (2 Points)       [ ] Stroke (in the previous month)                  (5 Points)            [ ] Elective major lower extremity arthroplasty (5 Points)                                   [  ] Malignancy (present or past include skin melanoma                                          but exclude  basal skin cell)    (2 points)                                      TRAUMA RELATED RISK FACTORS                HEMATOLOGY RELATED FACTORS                                  [x ] Fracture of the hip, pelvis, or leg                       (5 Points)  [ ] Prior episodes of VTE                                     (3 Points)          [ ] Acute spinal cord injury (in the previous month)  (5 Points)  [ ] Positive family history for VTE                         (3 Points)       [ ] Paralysis (less than 1 month)                          (5 Points)  [ ] Prothrombin 79790 A                                      (3 Points)         [ ] Multiple Trauma (within 1month)                 (5Points)                                                                                                                                                                [ ] Factor V Leiden                                          (3 Points)                                OTHER RISK FACTORS                          [ ] Lupus anticoagulants                                     (3 Points)                       [ ] BMI > 40                          (1 Point)                                                         [ ] Anticardiolipin antibodies                                (3 Points)                   [ ] Smoking                              (1Point)                                                [ ] High homocysteine in the blood                      (3 Points)                [  ] Diabetes requiring insulin (1point)                         [ ] Other congenital or acquired thrombophilia       (3 Points)          [  ] Chemotherapy                   (1 Point)  [ ] Heparin induced thrombocytopenia                  (3 Points)             [  ] Blood Transfusion                (1 point)                                                                                                             Total Score [   9 ]                                                                                                                                                                                                                                                                                                                                                                                                                                         IMPROVE Bleeding Risk Score:3.5        Falls Risk:   High (x  )  Mod (  )  Low (  )      FAMILY HISTORY:  No pertinent family history in first degree relatives      Denies any personal or familial history of clotting or bleeding disorders.    Allergies    No Known Allergies    Intolerances        REVIEW OF SYSTEMS    (  )Fever	     (  )Constipation	(  )SOB				(  )Headache	(  )Dysuria  (  )Chills	     (  )Melena	(  )Dyspnea present on exertion	                    (  )Dizziness                    (  )Polyuria  (  )Nausea	     (  )Hematochezia	(  )Cough			                    (  )Syncope   	(  )Hematuria  (  )Vomiting    (  )Chest Pain	(  )Wheezing			(  )Weakness  (  )Diarrhea     (  )Palpitations	(  )Anorexia			( x )joint pain    All  other review of systems negative: Yes  Vital Signs Last 24 Hrs  T(C): 37 (22 @ 08:59), Max: 37.2 (22 @ 20:48)  T(F): 98.6 (22 @ 08:59), Max: 99 (22 @ 20:48)  HR: 100 (22 @ 08:59) (68 - 110)  BP: 114/56 (22 @ 08:59) (114/56 - 120/73)  BP(mean): --  RR: 16 (22 @ 08:59) (16 - 18)  SpO2: 92% (22 @ 08:59) (92% - 93%)    PHYSICAL EXAM:    Constitutional: Appears Well    Neurological: A& O x 3    Skin: Warm    Respiratory and Thorax: normal effort; Breath sounds: normal; No rales/wheezing/rhonchi  	  Cardiovascular: S1, S2, regular, NMBR	    Gastrointestinal: BS + x 4Q, nontender	    Genitourinary:  Bladder nondistended, nontender    Musculoskeletal:   General Right:   + muscle/joint tenderness,   normal tone, no joint swelling,   ROM: limited	    General Left:   no muscle/joint tenderness,   normal tone, no joint swelling,   ROM: full    Hip:  Right: Dressing CDI;                 Lower extrems:   Right: no calf tenderness              negative angie's sign               + pedal pulses    Left:   no calf tenderness              negative angie's sign               + pedal pulses                                       9.3    10.80 )-----------( 245      ( 30 Sep 2022 07:47 )             28.1       09-30    142  |  110<H>  |  35<H>  ----------------------------<  130<H>  3.7   |  25  |  1.00    Ca    8.7      30 Sep 2022 07:47                          10.0   11.78 )-----------( 204      ( 29 Sep 2022 07:25 )             30.4       09-    140  |  109<H>  |  22  ----------------------------<  129<H>  3.7   |  25  |  0.86    Ca    8.6      29 Sep 2022 07:25                     10.2   13.07 )-----------( 222      ( 28 Sep 2022 08:39 )             31.3       09-    140  |  109<H>  |  18  ----------------------------<  151<H>  4.0   |  23  |  0.95    Ca    8.6      28 Sep 2022 08:39    TPro  x   /  Alb  3.4  /  TBili  x   /  DBili  x   /  AST  x   /  ALT  x   /  AlkPhos  x         PT/INR - ( 27 Sep 2022 04:44 )   PT: 12.2 sec;   INR: 1.05 ratio         PTT - ( 27 Sep 2022 04:44 )  PTT:26.0 sec				    MEDICATIONS  (STANDING):  acetaminophen     Tablet .. 975 milliGRAM(s) Oral every 8 hours  amLODIPine   Tablet 10 milliGRAM(s) Oral daily  apixaban 10 milliGRAM(s) Oral two times a day  ascorbic acid 500 milliGRAM(s) Oral two times a day  celecoxib 200 milliGRAM(s) Oral every 12 hours  cholecalciferol 2000 Unit(s) Oral daily  ferrous    sulfate 325 milliGRAM(s) Oral daily  folic acid 1 milliGRAM(s) Oral daily  lactated ringers. 1000 milliLiter(s) IV Continuous <Continuous>  lactated ringers. 1000 milliLiter(s) IV Continuous <Continuous>  levothyroxine 75 MICROGram(s) Oral daily  lisinopril 20 milliGRAM(s) Oral daily  multivitamin 1 Tablet(s) Oral daily  pantoprazole    Tablet 40 milliGRAM(s) Oral before breakfast  polyethylene glycol 3350 17 Gram(s) Oral daily  senna 2 Tablet(s) Oral at bedtime  simvastatin 40 milliGRAM(s) Oral at bedtime  sodium chloride 0.9%. 1000 milliLiter(s) IV Continuous <Continuous>        < from: Xray Femur 2 Views, Right (22 @ 03:04) >  IMPRESSION: Intertrochanteric fracture right hip.    Question possible lytic area in 1 of the femoral condyles. Suggest   specific right knee views.    < end of copied text >  < from: CT Angio Chest PE Protocol w/ IV Cont (22 @ 11:26) >  IMPRESSION:    Age-indeterminate pulmonary embolism within a subsegmental branch of the   right upper lobe. This was discussed with Dr. Dooley at 12:05PM at   2022.    < end of copied text >      DVT Prophylaxis:  LMWH                   (  )  Heparin SQ           (  )  Coumadin             (  )  Xarelto                  (  )  Eliquis                   ( x )  Venodynes           (x  )  Ambulation          ( x )  UFH                       (  )  Contraindicated  (  )  EC ASPIRIN       (  )             HPI:  PCP: DR. MINERVA Dooley      HPI:  this is an 89 yo female with pmh: HTN, HLD, hypothyroidism, who was visiting her son for the  holiday and while making coffee turned and caught her foot on part of the floor causing her to trip and fall, witnessed by son.  Pt denies any head strike or any dizziness or lightheadedness prior to falling, denies any recent f/c/r, denies and burning or frequency of urination.  She is not on any anticoagulation meds.  Denies any issues with anesthesia, denies any cardiac issues, states she follows regularly with her PCP. She exercises regularly, was in a exercise class prior to COVID, no longer attends but walk around her block regularly, denies any CP or SOB while doing so,  Imaging + for right IT fx, plan for OR today.     PMH: as above  PSH: "Ulcer surgery," colonscopy, upper endo  FH: mother dec: age 91: MI, Father dec: age 91: MI  Social history: former smoker, quit years ago, rare alcohol, no rec drugs  Allergies NKDA   (27 Sep 2022 09:28)      Patient is a 88y old  Female who presents with a chief complaint of mechanical fall, right hip pain (28 Sep 2022 09:22)      Consulted by Dr. Aviva Lucero   for VTE prophylaxis, risk stratification, and anticoagulation management.    PAST MEDICAL & SURGICAL HISTORY:    Interval History  2022:patient seen at bedside discussed the necessity of anticoagulation with Lovenox risks and benefits explained , patient denies any h/o vte,stroke or bleeding will continue on Lovenox patient in agreement   2022 Pt was having decreased o2 sat an ra 88-89%.  Pt seen at bedside with Daughter Yisel present.  Discussed the CT  angio RESULTS of a right upper lobe PE.  Discussed her options with anticoagulation Eliquis. Pt and daughter states they do not know if pt was ever dx with PEs before.  They wish to speak with her primary Dr Dooley ? first name before starting any anticoagulation.  They also stated the hospitalist here has not informed them yet of the pe.  I made them aware that once they decide what action they wish to take I will take care of order the ac. Literature given for Eliquis benefits and risks discuss.    2022 Pt seen at bedside [facility].   Discussed her anticoagulation with Eliquis.  Pt made aware of cost of $499 for 60 tabs.  I also called pt's son Giuseppe and informed him of the cost and made him aware it is a deductible as far as the cost.  He state they will take care of it, we also discussed other medications for ac and he state he will cont with the Eliquis. Pt to be discharged to rehab today at [facility].           CrCl:50.5  BMI:31.9  EBL:50ml    Caprini VTE Risk Score:CAPRINI SCORE  AGE RELATED RISK FACTORS                                                       MOBILITY RELATED FACTORS  [ ] Age 41-60 years                                            (1 Point)                  [ ] Bed rest /restricted mobility                             (1 Point)  [ ] Age: 61-74 years                                           (2 Points)                [ ] Plaster cast                                                   (2 Points)  [x ] Age= 75 years                                              (3 Points)                 [ ] Bed bound for more than 72 hours                   (2 Points)    DISEASE RELATED RISK FACTORS                                               GENDER SPECIFIC FACTORS  [ ] Edema in the lower extremities                       (1 Point)           [ ] Pregnancy                                                            (1 Point)  [ ] Varicose veins                                               (1 Point)                  [ ] Post-partum < 6 weeks                                      (1 Point)             [ x] BMI > 25 Kg/m2                                            (1 Point)                  [ ] Hormonal therapy or oral contraception       (1 Point)                 [ ] Sepsis (in the previous month)                        (1 Point)             [ ] History of pregnancy complications                (1Point)  [ ] Pneumonia or serious lung disease                                             [ ] Unexplained or recurrent  (=/>3), premature                                 (In the previous month)                               (1 Point)                birth with toxemia or growth-restricted infant (1 Point)  [ ] Abnormal pulmonary function test            (1 Point)                                   SURGERY RELATED RISK FACTORS  [ ] Acute myocardial infarction                       (1 Point)                  [ ]  Section                                         (1 Point)  [ ] Congestive heart failure (in the previous month) (1 Point)   [ ] Minor surgery   lasting <45 minutes       (1 Point)   [ ] Inflammatory bowel disease                             (1 Point)          [ ] Arthroscopic surgery                                  (2 Points)  [ ] Central venous access                                    (2 Points)            [ ] General surgery lasting >45 minutes      (2 Points)       [ ] Stroke (in the previous month)                  (5 Points)            [ ] Elective major lower extremity arthroplasty (5 Points)                                   [  ] Malignancy (present or past include skin melanoma                                          but exclude  basal skin cell)    (2 points)                                      TRAUMA RELATED RISK FACTORS                HEMATOLOGY RELATED FACTORS                                  [x ] Fracture of the hip, pelvis, or leg                       (5 Points)  [ ] Prior episodes of VTE                                     (3 Points)          [ ] Acute spinal cord injury (in the previous month)  (5 Points)  [ ] Positive family history for VTE                         (3 Points)       [ ] Paralysis (less than 1 month)                          (5 Points)  [ ] Prothrombin 49856 A                                      (3 Points)         [ ] Multiple Trauma (within 1month)                 (5Points)                                                                                                                                                                [ ] Factor V Leiden                                          (3 Points)                                OTHER RISK FACTORS                          [ ] Lupus anticoagulants                                     (3 Points)                       [ ] BMI > 40                          (1 Point)                                                         [ ] Anticardiolipin antibodies                                (3 Points)                   [ ] Smoking                              (1Point)                                                [ ] High homocysteine in the blood                      (3 Points)                [  ] Diabetes requiring insulin (1point)                         [ ] Other congenital or acquired thrombophilia       (3 Points)          [  ] Chemotherapy                   (1 Point)  [ ] Heparin induced thrombocytopenia                  (3 Points)             [  ] Blood Transfusion                (1 point)                                                                                                             Total Score [   9 ]                                                                                                                                                                                                                                                                                                                                                                                                                                         IMPROVE Bleeding Risk Score:3.5        Falls Risk:   High (x  )  Mod (  )  Low (  )      FAMILY HISTORY:  No pertinent family history in first degree relatives      Denies any personal or familial history of clotting or bleeding disorders.    Allergies    No Known Allergies    Intolerances        REVIEW OF SYSTEMS    (  )Fever	     (  )Constipation	(  )SOB				(  )Headache	(  )Dysuria  (  )Chills	     (  )Melena	(  )Dyspnea present on exertion	                    (  )Dizziness                    (  )Polyuria  (  )Nausea	     (  )Hematochezia	(  )Cough			                    (  )Syncope   	(  )Hematuria  (  )Vomiting    (  )Chest Pain	(  )Wheezing			(  )Weakness  (  )Diarrhea     (  )Palpitations	(  )Anorexia			( x )joint pain    All  other review of systems negative: Yes  Vital Signs Last 24 Hrs  T(C): 37 (22 @ 08:59), Max: 37.2 (22 @ 20:48)  T(F): 98.6 (22 @ 08:59), Max: 99 (22 @ 20:48)  HR: 100 (22 @ 08:59) (68 - 110)  BP: 114/56 (22 @ 08:59) (114/56 - 120/73)  BP(mean): --  RR: 16 (22 @ 08:59) (16 - 18)  SpO2: 92% (22 @ 08:59) (92% - 93%)    PHYSICAL EXAM:    Constitutional: Appears Well    Neurological: A& O x 3    Skin: Warm    Respiratory and Thorax: normal effort; Breath sounds: normal; No rales/wheezing/rhonchi  	  Cardiovascular: S1, S2, regular, NMBR	    Gastrointestinal: BS + x 4Q, nontender, large hernia noted mid abd. 	    Genitourinary:  Bladder nondistended, nontender    Musculoskeletal:   General Right:   + muscle/joint tenderness,   normal tone, no joint swelling,   ROM: limited	    General Left:   no muscle/joint tenderness,   normal tone, no joint swelling,   ROM: full    Hip:  Right: Dressing CDI;                 Lower extrems:   Right: no calf tenderness              negative angie's sign               + pedal pulses    Left:   no calf tenderness              negative angie's sign               + pedal pulses                                       9.3    10.80 )-----------( 245      ( 30 Sep 2022 07:47 )             28.1       09-    142  |  110<H>  |  35<H>  ----------------------------<  130<H>  3.7   |  25  |  1.00    Ca    8.7      30 Sep 2022 07:47                          10.0   11.78 )-----------( 204      ( 29 Sep 2022 07:25 )             30.4       09-    140  |  109<H>  |  22  ----------------------------<  129<H>  3.7   |  25  |  0.86    Ca    8.6      29 Sep 2022 07:25                     10.2   13.07 )-----------( 222      ( 28 Sep 2022 08:39 )             31.3       09-    140  |  109<H>  |  18  ----------------------------<  151<H>  4.0   |  23  |  0.95    Ca    8.6      28 Sep 2022 08:39    TPro  x   /  Alb  3.4  /  TBili  x   /  DBili  x   /  AST  x   /  ALT  x   /  AlkPhos  x         PT/INR - ( 27 Sep 2022 04:44 )   PT: 12.2 sec;   INR: 1.05 ratio         PTT - ( 27 Sep 2022 04:44 )  PTT:26.0 sec				    MEDICATIONS  (STANDING):  acetaminophen     Tablet .. 975 milliGRAM(s) Oral every 8 hours  amLODIPine   Tablet 10 milliGRAM(s) Oral daily  apixaban 10 milliGRAM(s) Oral two times a day  ascorbic acid 500 milliGRAM(s) Oral two times a day  celecoxib 200 milliGRAM(s) Oral every 12 hours  cholecalciferol 2000 Unit(s) Oral daily  ferrous    sulfate 325 milliGRAM(s) Oral daily  folic acid 1 milliGRAM(s) Oral daily  lactated ringers. 1000 milliLiter(s) IV Continuous <Continuous>  lactated ringers. 1000 milliLiter(s) IV Continuous <Continuous>  levothyroxine 75 MICROGram(s) Oral daily  lisinopril 20 milliGRAM(s) Oral daily  multivitamin 1 Tablet(s) Oral daily  pantoprazole    Tablet 40 milliGRAM(s) Oral before breakfast  polyethylene glycol 3350 17 Gram(s) Oral daily  senna 2 Tablet(s) Oral at bedtime  simvastatin 40 milliGRAM(s) Oral at bedtime  sodium chloride 0.9%. 1000 milliLiter(s) IV Continuous <Continuous>        < from: Xray Femur 2 Views, Right (22 @ 03:04) >  IMPRESSION: Intertrochanteric fracture right hip.    Question possible lytic area in 1 of the femoral condyles. Suggest   specific right knee views.    < end of copied text >  < from: CT Angio Chest PE Protocol w/ IV Cont (22 @ 11:26) >  IMPRESSION:    Age-indeterminate pulmonary embolism within a subsegmental branch of the   right upper lobe. This was discussed with Dr. Dooley at 12:05PM at   2022.    < end of copied text >      DVT Prophylaxis:  LMWH                   (  )  Heparin SQ           (  )  Coumadin             (  )  Xarelto                  (  )  Eliquis                   ( x )  Venodynes           (x  )  Ambulation          ( x )  UFH                       (  )  Contraindicated  (  )  EC ASPIRIN       (  )

## 2022-09-30 NOTE — PROGRESS NOTE ADULT - REASON FOR ADMISSION
mechanical fall, right hip pain

## 2022-09-30 NOTE — DISCHARGE NOTE NURSING/CASE MANAGEMENT/SOCIAL WORK - NSDCPEFALRISK_GEN_ALL_CORE
For information on Fall & Injury Prevention, visit: [email or web address] OR  [email or web address] OR  https://www.cdc.gov/steadi/patient.html

## 2022-09-30 NOTE — DISCHARGE NOTE NURSING/CASE MANAGEMENT/SOCIAL WORK - BRAND OF COVID-19 VACCINATION
Pt states pfizer injections x 4, last dose received approximately 2 months ago, does not remember exact date/Pfizer dose 1, 2, and 3

## 2022-09-30 NOTE — PROGRESS NOTE ADULT - SUBJECTIVE AND OBJECTIVE BOX
Patient seen and examined at bedside. Pain well controlled with medication. Patient denies any numbness, tingling, weakness, or any other orthopaedic complaint. Denies N/V/CP/SOB. 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 Patient reports no calf pain, no difficulty breathing. Patient reports she had difficulty working with PT yesterday because she was "scared to put weight on the leg".      Exam:    Gen: Resting in bed, no acute distress  RLE  Dressing in place, clean/dry/intact.   Madalyn-incisional tenderness to palpation; otherwise, NTTP throughout the rest of the extremity.   SILT L2-S1.  GSC/TA/EHL/FHL intact. Q/H unable to assess 2' pain.   DP pulse palpable.   No calf tenderness bilaterally.   Compartments soft and compressible.                            Assessment and Plan:  88y Female POD3 R IMN    Appreciate medical management wrt PE/DVT W/U - continue with Eliquis to prevent clot propogation   WBAT/PT/OT  Pain management PRN  DVT PPx: per medicine/AC team recs  Incentive spirometry  Dispo: [facility] per PT  Will discuss with Dr. Chicas and advise of any changes to the plan.
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
## 2022-10-05 DIAGNOSIS — S72.141A DISPLACED INTERTROCHANTERIC FRACTURE OF RIGHT FEMUR, INITIAL ENCOUNTER FOR CLOSED FRACTURE: ICD-10-CM

## 2022-10-05 DIAGNOSIS — E78.5 HYPERLIPIDEMIA, UNSPECIFIED: ICD-10-CM

## 2022-10-05 DIAGNOSIS — I10 ESSENTIAL (PRIMARY) HYPERTENSION: ICD-10-CM

## 2022-10-05 DIAGNOSIS — J98.19 OTHER PULMONARY COLLAPSE: ICD-10-CM

## 2022-10-05 DIAGNOSIS — I82.461 ACUTE EMBOLISM AND THROMBOSIS OF RIGHT CALF MUSCULAR VEIN: ICD-10-CM

## 2022-10-05 DIAGNOSIS — I26.93 SINGLE SUBSEGMENTAL PULMONARY EMBOLISM WITHOUT ACUTE COR PULMONALE: ICD-10-CM

## 2022-10-05 DIAGNOSIS — D62 ACUTE POSTHEMORRHAGIC ANEMIA: ICD-10-CM

## 2022-10-05 LAB
CULTURE RESULTS: SIGNIFICANT CHANGE UP
CULTURE RESULTS: SIGNIFICANT CHANGE UP
SPECIMEN SOURCE: SIGNIFICANT CHANGE UP
SPECIMEN SOURCE: SIGNIFICANT CHANGE UP

## 2022-10-11 NOTE — ED PROVIDER NOTE - INTERNATIONAL TRAVEL
11-12 YEARS:    It was a pleasure seeing your child in clinic today.    At [facility], one important tool we use to improve our patient services is our Patient Survey.   Following your visit with VIckie Bleser, PNP & DINORA Majano you may receive our survey in the mail.   Please take the time to complete the survey.   If your visit with Christina Majano was great, they want to hear about it!  If Christina Majano can improve, please let them know how.     Follow-up in 1 year for next well visit. We will contact you if anything is abnormal.    Check out [email or web address] or [email or web address] for excellent information about physical wellness, puberty/reproductive health, and mental health.    Healthy Habit Recommendations:  5 - 5 fruits and vegetables daily.  2 - Limit screen time to less than 1-2 hours daily.  1 - Get at least 1 hour of exercise per day most days of the week.  0 - Do not drink soda, Gatorade, or other sweet drinks.    Electronic Use and Social Media  As technology becomes more readily available to teenagers, it is important to practice safe use of social media and to try to limit non-academic electronic use to <2 hours per day. Consider utilizing  a central “charging station” for all electronics (cell phones, tablets, laptops, etc) over night (e.g. in the kitchen, parents’ room, etc.) to decrease the temptation to stay up late utilizing these electronics and promote good sleep hygiene.     Understanding the Way a Teenager Thinks  Just as a teenager’s body is rapidly changing, so is his/her brain. The human brain, particularly the pre-frontal cortex that helps with complex thinking and decision making, is not fully matured until early adulthood (~24 years old). On the other hand, the “amydala” or reward-center of the brain develops much sooner which can help explain a teenager’s more emotional/aggressive/impulsive behaviors. Consequently, the pressures and  stresses of teenage life sometimes demand more than the teenage brain can comprehend.  This is why it is important for parents to be available to their teenagers to help them slow down and think through big decisions.  It is also important to help teens think abstractly about the consequences of their decisions. If the stressors and demands of teenage life are too much to handle within the family, it may be beneficial to work with a counselor at school or even a mental health provider to help learn coping skills. See the “Red Flags” below:    Mental Health Red Flags:  Excessive sleeping, beyond usual teenage fatigue, which could indicate depression or substance abuse; difficulty in sleeping, insomnia, and other sleep disorders  Loss of self-esteem  Abandonment or loss of interest in favorite pastimes  Unexpected and dramatic decline in academic performance  Weight loss and loss of appetite, which could indicate an eating disorder  Personality shifts and changes, such as aggressiveness and excess anger that are sharply out of character and could indicate psychological, drug, or sexual problems  (Adapted from healthychildren.org, “What’s Going On in The Teenage Brain” and “Mental Health and Teens: Watch for Danger Signs”, last updated 2015)    Educating your teens on drugs/alcohol:    Alcohol:  Adverse effects include: poor judgment, behavior changes, increased aggression, poor school performance, altered growth and development, decreased inhibition (increased risk taking behavior including crimes, violence, sexual behavior resulting in STDs and pregnancy), car accidents due to driving under the influence of alcohol, organ damage  If you are in a place where others are using alcohol, have an understanding with your parents that they will pick you up “no questions asked”  Cigarettes:  Immediate adverse effects include stained teath, bad breath, smelly clothes, cough, increased heart rate and blood pressure, poor sports  performance, poor school performance  Smoking is highly addictive. More than 1/3 of people who try smoking become addicted  30% of cancer deaths are linked to smoking  There are 4000 chemicals in cigarette smoke including chemicals used in rat poisoning, toilet , lighter fluid, insecticides, and poisons    Marijuana:  Immediate adverse effects include: blood shot eyes, dry mouth, memory and learning problems, impaired thinking/judgement/coordination, increased heart rate, impaired perception of surroundings,  increased appetite and weight gain, paranoia, psychosis  Long term adverse effects: lung disease, cancer, decreased memory/attention/learning, weakened immune system  Marijuana is twice as addictive to children 18 and younger compared to adults  Marijuana is illegal in most states (including [place]) and can result in detention time and charges on your permanent record which can affect your ability to get into college or get a job.  Impaired thinking while using marijuana can result in car accidents and risky sexual behaviors (STD exposure)  Marijuana can be laced with other dangerous drugs without your knowing    Resources:  The Little Book About a Whole Lot of Stuff;  Marijuana: facts for teens from National institute on drug abuse, NIH  American Academy of Pediatrics  www.health.org/govpubs/LGT754/6parguid.htm  www.health.org/govpubs/RAX995/6parguid.htm      Follow-up in 1 year for next well visit.  Healthy Habit Recommendations:  5 - 5 fruits and vegetables daily.  2 - Limit screen time to less than 1-2 hours daily.  1 - Get at least 1 hour of exercise per day most days of the week.  0 - Do not drink soda, Gatorade, or other sweet drinks.    Please visit [email or web address] for more information about your pre-teen (There is helpful information about puberty, nutrition, fitness, and school )   HealthyChildren.org is the American Academy of Pediatrics official website for parents. You can find useful,  accurate information and advice about development, behavior, growth, common illnesses, feeding and sleeping concerns and other common childhood issues.     Tips for help students starting [employer]:    Location   Often transitioning to [employer] and high school involves changing school buildings. This may also involve taking new or different modes of transportation such as the bus or maybe driving to school. The bus route should be reviewed including the times and location of pick-up and drop-off. Students who drive to school should practice their route including parking locations and any specific local parking rules. Plan for alternatives if the school day starts late or ends early. What will the student do?     School supplies and clothing   Schools will often have a list of needed school supplies but often the list changes once the student is in the classroom. Consider purchasing a limited amount of general supplies before school. Similarly clothing should make the student feel comfortable and is serviceable. Know the school dress code rules and allow the student to choose the clothes they want to wear within reason. Consider purchasing a limited amount of clothing before school and then seeing what might be more fashionable and accepted after the school year begins and purchasing other clothing then.     Combination locks   The idea of not being able to open their locker can be scary for many students, especially for new [employer] students. Sometimes students are so afraid that will carry all their books and belonging and become “walking lockers..” Getting a combination lock and practicing before school starts can help students. After a locker is assigned, having the student open the locker a couple of times with a parent or older sibling available also helps with this fear. Remember that students often have more than one locker that they use including their regular locker, gym locker, athletic locker, and  musical instrument locker. It’s not surprising that this can be a little overwhelming for them even at the high school level with many other things to remember.     Friends   Having a “kristen” for the first few days of school can help with the transition as the student often feels more comfortable with a friendly face and knows they won’t be “alone” for lunch, the bus ride, or during class.   Students should be encouraged to continue good relationships with their friends but also to try to meet new friends. If the student is afraid they won’t have friends asking questions such as “Do you know anyone from your old school?” is a good starting place. “I know you had friends at your old school. What do you think could happen if you didn’t make new friends?” or “What made those friendships successful?” can help the student think about the situation and what they could do to help themselves.     Multiple classes and being late to class   Taking a tour of the school building to learn the location of classrooms, lockers, restroom and lunch room helps to get the student off to a good start.   Once classes are assigned, walking around the school to find the classrooms will help with the worries that many students have about getting to class on time. An older student often has many tips to get from class to class effectively.   If the schedule changes from day to day, going over the schedule the night before can be helpful especially at the beginning of the year, especially for [employer] students.     Increased Academic Work   As students advance though their school career, the expectations also increase. They have to master the content but also have to learn from multiple teachers who may have different teaching styles than they are used to.   Many teachers have syllabi that give an overview of their classroom expectations for homework, grading, how to contact the teacher and how to get extra help if needed. Reading the  syllabi, by both students and parents, can help the student to know more about the class at the beginning of the year. Keep it for a reference for when the student needs to get extra help or to contact the teacher. Students should be their own advocate and contact the teacher themself at first. If there is a problem then the parent can work with the student to help contact the teacher and get the necessary help.   Identifying a “study kristen” for each class is a great way for students to have someone to contact if they miss an assignment or to clarify an assignment.   Create a homework place for the student to do their homework that is well lit, comfortable and has necessary supplies close at hand. Students have been sitting in desks all day so some student will want to work on their bed or on the floor. This is okay if they are efficient and able to complete the work in a reasonable amount of time and learn the material. Distractions such as televisions, computers, and phones should be kept out of the homework space if possible. They can be used as a “study break” for a short period of time while homework is being completed. Internet use for school is common and it is recommended that all Internet use occur in a common place of the home such as the kitchen so parents can appropriately monitor its use.   A general rule for homework is about 10 minutes per year of school. While there will be daily differences, if the student is consistently spending a long time doing homework, talk with them about if they are having problems organizing the work, efficiently completing the work, problems understanding the work in general or in a specific subject. This may help the student and the parent understand if there is a time management or prioritization problem or if the students needs additional help in a particular subject or even has an underlying attention or learning problem.     Time management and school planners   Using  time wisely is one of the most important skills that [employer] and high school students need to learn.   Using a paper or electronic school planner or calendar (or even a small notebook) is a must for students. Recording daily homework assignments, longer-term project deadlines and school and home activities in one place helps them to learn to plan and prioritize their time wisely. This is a necessary skill and parents may need to help them to learn to use their planner.   As different people like to record this information in different ways, different planners can be tried. Paper planners are inexpensive and easy to use, but may not be in the right location all the time or can be lost. Electronic planners are easily backed up so are less likely to be lost. An electronic student planner can be linked to a teacher’s electronic class calendar so the information doesn’t have to be copied. Electronic planners require Internet access though and may not be available during the school day or at other times.   Whatever planner is used it should be readily available and easy for the student to use. Parents can help the students by showing or reviewing with students reviewing different ways to use a planner.     Setting priorities   Balancing busy schedules can be difficult for students.   Students need to remain healthy which means that physiologic needs should come first when planning their general daily schedule. Regular sleep (and enough sleep ~8-10 hours/day) and meal times should be the first priority for planning a regular daily schedule. As school is the student’s occupation and is a large component of their day, planning the school day and homework is usually the next priority. After this students should schedule family, extracurricular and other personal time as a third priority. While there will be day-to-day and week-to-week variations, a general daily schedule helps students prioritize the important parts of  their days in a healthy way.   Parents can help the students by enforcing regular sleep and eating schedules, and helping students learn to prioritize all of their daily activities.   Extracurricular activities at the [employer] levels often allow all or most students to participate. At the high school level, some activities, such as sports, may require tryouts or auditions. Usually there are many other activities for students to participate in, if they do not “make the team” in one activity.    Technology   Technology can improve the educational opportunities for students in and out of the classroom. It can also improve communication among teachers, coaches, parents, peers and students. Technology has to be used wisely though.   Technology and the learning environment need to both be respected. In general, computers, phones and other devices should be put away while in class and doing homework. Study breaks or passing time between classes usually are appropriate opportunities to use the devices. Expectation for social media use should be discussed and rules enforced by parents and teachers. Student often do need access to a cellphone (their own, parent or peer) as many schools do not have pay phones and offices are locked at off times. Students can be in contact with parents about after school activities and to check in about their location at home or school. Students should remember that having and using a cellphone or other device is a privilege and with that goes the responsibility of appropriate use.     Communication   Teenagers are trying to become adults during their adolescent years. They may not communicate the way they did as school agers or as an adult would. Parents should continue to talk with their teenager, even if answers are not as forthcoming and discussions are short. Asking open-ended questions can often be helpful because it allows the students to better describe how their day was, or what  they did in class. Parents do not need to offer solutions to every problem a students has as often students, like adults, often just need a person to talk to about an issue. Listening by parents should not be underestimated.   Parents should keep listening and talking with their student even if they think the student isn’t listening. Family meals or even a beverage break can be good times to talk with students. Car rides may not be a good time as students are often tired after school and activities. It may also be the first time since the morning that there has been some quiet too because school is a noisy place.     Stress   All people have some type of stress in their life and teenagers are no different. Being upset about not understanding the English assignment, hearing a nasty comment by another student, messing up on the choir audition or just even forgetting a school supply can cause stress. Students who are doing well in school, have several friends they talk about, are able to eat meals regularly and getting sleep regularly probably are doing well. Students who may not be handling their stress well may need professional help. Talking with the school guidance counselor, medical professional or spiritual counselor may offer some ideas about how to help the student.   Indications that students may not be doing well include:   Poor sleep pattern, poor eating pattern or poor grooming   Personality changes such as being more angry or violent, being withdrawn, moodiness, or irritability   Having anxiety or panic attacks or sadness or depression or becoming violent   Loss of friends or abrupt change in the group of friends   Any indications the student may be using tobacco or drugs   Physical symptoms such as headaches, abdominal pain or chest pain   Constantly talking about being hassled or hurried   It is understandable that some of these signs occur for short time periods such as around final exams or other  stressful times. But if they occur most days, are getting worse, or extend beyond a reasonable time period, students usually should get some professional help    No

## 2022-10-13 PROBLEM — Z00.00 ENCOUNTER FOR PREVENTIVE HEALTH EXAMINATION: Status: ACTIVE | Noted: 2022-10-13

## 2022-10-20 ENCOUNTER — APPOINTMENT (OUTPATIENT)
Dept: ORTHOPEDIC SURGERY | Facility: CLINIC | Age: 87
End: 2022-10-20
Payer: MEDICARE

## 2022-10-20 ENCOUNTER — NON-APPOINTMENT (OUTPATIENT)
Age: 87
End: 2022-10-20

## 2022-10-20 VITALS
SYSTOLIC BLOOD PRESSURE: 123 MMHG | HEIGHT: 62 IN | WEIGHT: 165 LBS | DIASTOLIC BLOOD PRESSURE: 76 MMHG | HEART RATE: 89 BPM | BODY MASS INDEX: 30.36 KG/M2

## 2022-10-20 PROCEDURE — 99024 POSTOP FOLLOW-UP VISIT: CPT

## 2022-10-20 PROCEDURE — 73502 X-RAY EXAM HIP UNI 2-3 VIEWS: CPT | Mod: RT

## 2022-10-20 NOTE — PHYSICAL EXAM
[de-identified] : Patient in wheelchair.\par Right hip\par Incisions healing well without signs of infection or active drainage.  Prineo still intact over incisions [de-identified] : AP pelvis and AP and lateral right hip taken to the office reviewed–hardware in good position

## 2022-10-20 NOTE — DISCUSSION/SUMMARY
[de-identified] : 88-year-old female status post right hip short IM nail on the 9/28/2022 for right intertrochanteric fracture\par \par Patient doing well, continuing physical therapy and rehab facility.  Has been ambulating over 100 feet with walker.  On Eliquis for DVT prophylaxis which she should continue.  Weightbearing as tolerated right lower extremity.  Follow-up in 1 month with repeat x-rays.

## 2022-10-20 NOTE — HISTORY OF PRESENT ILLNESS
[de-identified] : 88-year-old female here for follow-up for right hip fracture status post short IM nail.  Patient states she is doing well, ambulating with a walker approximately 100 feet.  She is at a rehab facility currently.  Denies numbness or tingling.  States her pain is much better.  Is taking Eliquis for DVT prophylaxis.

## 2022-11-17 ENCOUNTER — APPOINTMENT (OUTPATIENT)
Dept: ORTHOPEDIC SURGERY | Facility: CLINIC | Age: 87
End: 2022-11-17
Payer: MEDICARE

## 2022-11-17 PROCEDURE — 73502 X-RAY EXAM HIP UNI 2-3 VIEWS: CPT | Mod: 26,RT

## 2022-11-17 PROCEDURE — 99024 POSTOP FOLLOW-UP VISIT: CPT

## 2022-11-17 NOTE — HISTORY OF PRESENT ILLNESS
[de-identified] : 88-year-old female here for follow-up for right hip fracture status post short IM nail.  Patient states she is doing well, ambulating with a walker.  She is at a rehab facility currently.  Denies numbness or tingling.  States her pain is much better.  Is taking Eliquis for DVT prophylaxis. [de-identified] : Patient in wheelchair.\par Right hip\par Incisions healing well without signs of infection or active drainage.  Prineo still intact over proximal incision.  No pain with internal or external rotation [de-identified] : 88-year-old female status post right hip short IM nail on the 9/28/2022 for right intertrochanteric fracture\par \par Patient doing well, continuing physical therapy and rehab facility.  Has been ambulating with walker.  On Eliquis for DVT prophylaxis.  Weightbearing as tolerated right lower extremity.  Follow-up in 6 weeks.

## 2022-11-17 NOTE — PHYSICAL EXAM
[de-identified] : AP pelvis and AP and lateral right hip taken to the office reviewed–hardware in good position

## 2022-12-30 ENCOUNTER — APPOINTMENT (OUTPATIENT)
Dept: ORTHOPEDIC SURGERY | Facility: CLINIC | Age: 87
End: 2022-12-30
Payer: MEDICARE

## 2022-12-30 PROCEDURE — 99213 OFFICE O/P EST LOW 20 MIN: CPT

## 2022-12-30 PROCEDURE — 73502 X-RAY EXAM HIP UNI 2-3 VIEWS: CPT | Mod: 26,RT

## 2022-12-30 NOTE — PHYSICAL EXAM
[de-identified] : AP pelvis and AP and lateral right hip taken to the office reviewed–hardware in good position

## 2022-12-30 NOTE — HISTORY OF PRESENT ILLNESS
[de-identified] : 88-year-old female here for follow-up for right hip fracture status post short IM nail on 9/28/2022.  Patient states she is doing well, ambulating with a walker.  She is at a rehab facility currently.  Denies numbness or tingling.  States her pain is much better.  Is taking Eliquis for DVT prophylaxis. [de-identified] : Right hip\par No pain with internal or external rotation.  No pain with axial load or logroll [de-identified] : AP and lateral right hip taken today notes reviewed interpreted–hardware in good alignment, fracture healing well [de-identified] : 88-year-old female status post right hip short IM nail on the 9/28/2022 for right intertrochanteric fracture\par \par Patient doing well, continuing physical therapy and rehab facility.  Has been ambulating with walker.  On Eliquis for DVT prophylaxis.  Weightbearing as tolerated right lower extremity.  Follow-up in 3 months

## 2023-01-26 NOTE — DISCHARGE NOTE NURSING/CASE MANAGEMENT/SOCIAL WORK - NSDCPETBCESMAN_GEN_ALL_CORE
Never smoker If you are a smoker, it is important for your health to stop smoking. Please be aware that second hand smoke is also harmful.

## 2023-03-03 ENCOUNTER — APPOINTMENT (OUTPATIENT)
Dept: ORTHOPEDIC SURGERY | Facility: CLINIC | Age: 88
End: 2023-03-03
Payer: MEDICARE

## 2023-03-03 PROCEDURE — 99213 OFFICE O/P EST LOW 20 MIN: CPT

## 2023-03-03 PROCEDURE — 73502 X-RAY EXAM HIP UNI 2-3 VIEWS: CPT | Mod: RT

## 2023-03-03 NOTE — PHYSICAL EXAM
[de-identified] : Right lower extremity\par Nontender to palpation\par No pain with axial load, range of motion\par  [de-identified] : 3/3/2023–AP lateral right hip shows IM nail in good position, fracture appears healed

## 2023-03-03 NOTE — DISCUSSION/SUMMARY
[de-identified] : status post right hip short IM nail on the 9/28/2022 for right intertrochanteric fracture\par \par Patient doing very well.  Prescription for physical therapy was given to the patient for her to try to wean off of her walker and switch to a cane.  Of session was be careful when trying to stop using the walker.  Encouraged physical activity so she get her strength up and wean herself off.  All questions answered.  Patient should follow-up in 6 months.

## 2023-03-03 NOTE — HISTORY OF PRESENT ILLNESS
[de-identified] : 3/3/2023–patient here for follow-up for right hip short IM nail on 9/28/2022.  Patient states she is doing well.  She is ambulating with a walker.  She did not use anything prior to the surgery however.  She think she may be ready to get off the walker but she feels like it is more in her head.\par \par 12/30/2022:88-year-old female here for follow-up for right hip fracture status post short IM nail on 9/28/2022. Patient states she is doing well, ambulating with a walker. She is at a rehab facility currently. Denies numbness or tingling. States her pain is much better. Is taking Eliquis for DVT prophylaxis. \par

## 2023-10-19 ENCOUNTER — APPOINTMENT (OUTPATIENT)
Dept: ORTHOPEDIC SURGERY | Facility: CLINIC | Age: 88
End: 2023-10-19
Payer: MEDICARE

## 2023-10-19 DIAGNOSIS — S72.001S FRACTURE OF UNSPECIFIED PART OF NECK OF RIGHT FEMUR, SEQUELA: ICD-10-CM

## 2023-10-19 PROCEDURE — 73502 X-RAY EXAM HIP UNI 2-3 VIEWS: CPT | Mod: RT

## 2023-10-19 PROCEDURE — 99213 OFFICE O/P EST LOW 20 MIN: CPT

## 2025-02-24 NOTE — ED PROVIDER NOTE - NS ED MD DISPO ADMITTING SERVICE
Stable.  Continue on Rosuvastatin 5 mg a day.  Continue to decrease fried foods/fast foods.  Orders:    Lipid Panel; Future     MED